# Patient Record
Sex: MALE | Race: WHITE | NOT HISPANIC OR LATINO | Employment: OTHER | URBAN - METROPOLITAN AREA
[De-identification: names, ages, dates, MRNs, and addresses within clinical notes are randomized per-mention and may not be internally consistent; named-entity substitution may affect disease eponyms.]

---

## 2023-02-08 PROBLEM — E66.09 CLASS 1 OBESITY DUE TO EXCESS CALORIES WITH BODY MASS INDEX (BMI) OF 33.0 TO 33.9 IN ADULT: Status: ACTIVE | Noted: 2023-02-08

## 2023-02-08 PROBLEM — R42 DIZZINESS: Status: ACTIVE | Noted: 2023-02-08

## 2023-02-08 PROBLEM — G89.29 CHRONIC LOW BACK PAIN WITH BILATERAL SCIATICA: Status: ACTIVE | Noted: 2023-02-08

## 2023-02-08 PROBLEM — R11.0 NAUSEA IN ADULT: Status: ACTIVE | Noted: 2023-02-08

## 2023-02-08 PROBLEM — E11.65 TYPE 2 DIABETES MELLITUS WITH HYPERGLYCEMIA (MULTI): Status: ACTIVE | Noted: 2023-02-08

## 2023-02-08 PROBLEM — I25.10 ATHEROSCLEROTIC HEART DISEASE OF NATIVE CORONARY ARTERY WITHOUT ANGINA PECTORIS: Status: ACTIVE | Noted: 2023-02-08

## 2023-02-08 PROBLEM — M54.41 CHRONIC LOW BACK PAIN WITH BILATERAL SCIATICA: Status: ACTIVE | Noted: 2023-02-08

## 2023-02-08 PROBLEM — J01.90 ACUTE SINUSITIS: Status: ACTIVE | Noted: 2023-02-08

## 2023-02-08 PROBLEM — R10.12 LEFT UPPER QUADRANT ABDOMINAL PAIN: Status: ACTIVE | Noted: 2023-02-08

## 2023-02-08 PROBLEM — K20.80 ESOPHAGITIS, LOS ANGELES GRADE A: Status: ACTIVE | Noted: 2023-02-08

## 2023-02-08 PROBLEM — Z86.010 HISTORY OF COLON POLYPS: Status: ACTIVE | Noted: 2023-02-08

## 2023-02-08 PROBLEM — E29.1 HYPOGONADISM MALE: Status: ACTIVE | Noted: 2023-02-08

## 2023-02-08 PROBLEM — Z86.0100 HISTORY OF COLON POLYPS: Status: ACTIVE | Noted: 2023-02-08

## 2023-02-08 PROBLEM — R10.13 EPIGASTRIC PAIN: Status: ACTIVE | Noted: 2023-02-08

## 2023-02-08 PROBLEM — E78.5 HYPERLIPIDEMIA: Status: ACTIVE | Noted: 2023-02-08

## 2023-02-08 PROBLEM — M79.89 RIGHT LEG SWELLING: Status: ACTIVE | Noted: 2023-02-08

## 2023-02-08 PROBLEM — R06.02 EXERCISE-INDUCED SHORTNESS OF BREATH: Status: ACTIVE | Noted: 2023-02-08
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
## 2023-02-08 PROBLEM — R13.10 DYSPHAGIA: Status: ACTIVE | Noted: 2023-02-08

## 2023-02-08 PROBLEM — H81.10 BPV (BENIGN POSITIONAL VERTIGO): Status: ACTIVE | Noted: 2023-02-08

## 2023-02-08 PROBLEM — I10 BENIGN ESSENTIAL HYPERTENSION: Status: ACTIVE | Noted: 2023-02-08

## 2023-02-08 PROBLEM — M96.1 FAILED BACK SYNDROME OF LUMBAR SPINE: Status: ACTIVE | Noted: 2023-02-08

## 2023-02-08 PROBLEM — M54.42 CHRONIC LOW BACK PAIN WITH BILATERAL SCIATICA: Status: ACTIVE | Noted: 2023-02-08

## 2023-02-08 PROBLEM — K21.9 GERD (GASTROESOPHAGEAL REFLUX DISEASE): Status: ACTIVE | Noted: 2023-02-08

## 2023-02-08 PROBLEM — R00.2 PALPITATIONS: Status: ACTIVE | Noted: 2023-02-08

## 2023-02-08 PROBLEM — E66.811 CLASS 1 OBESITY DUE TO EXCESS CALORIES WITH BODY MASS INDEX (BMI) OF 33.0 TO 33.9 IN ADULT: Status: ACTIVE | Noted: 2023-02-08

## 2023-02-08 PROBLEM — Z95.5 PRESENCE OF STENT IN CORONARY ARTERY: Status: ACTIVE | Noted: 2023-02-08

## 2023-02-08 RX ORDER — ATORVASTATIN CALCIUM 80 MG/1
1 TABLET, FILM COATED ORAL NIGHTLY
COMMUNITY
Start: 2017-10-25 | End: 2023-03-22 | Stop reason: SDUPTHER

## 2023-02-08 RX ORDER — CARVEDILOL 3.12 MG/1
1 TABLET ORAL
COMMUNITY
Start: 2017-05-19 | End: 2023-03-22 | Stop reason: SDUPTHER

## 2023-02-08 RX ORDER — INSULIN LISPRO 200 [IU]/ML
46 INJECTION, SOLUTION SUBCUTANEOUS
COMMUNITY
Start: 2017-10-19 | End: 2023-07-05

## 2023-02-08 RX ORDER — LISINOPRIL 5 MG/1
1 TABLET ORAL DAILY
COMMUNITY
Start: 2017-05-19 | End: 2023-03-22 | Stop reason: SDUPTHER

## 2023-02-08 RX ORDER — PANTOPRAZOLE SODIUM 40 MG/1
1 TABLET, DELAYED RELEASE ORAL 2 TIMES DAILY
COMMUNITY
Start: 2020-12-11 | End: 2023-03-22 | Stop reason: SDUPTHER

## 2023-02-08 RX ORDER — BLOOD SUGAR DIAGNOSTIC
STRIP MISCELLANEOUS 4 TIMES DAILY
COMMUNITY
Start: 2017-06-26 | End: 2023-06-27 | Stop reason: WASHOUT

## 2023-02-08 RX ORDER — TRIAZOLAM 0.25 MG/1
0.25 TABLET ORAL
COMMUNITY
End: 2023-06-27 | Stop reason: WASHOUT

## 2023-03-13 LAB
ALANINE AMINOTRANSFERASE (SGPT) (U/L) IN SER/PLAS: 14 U/L (ref 10–52)
ALBUMIN (G/DL) IN SER/PLAS: 4.1 G/DL (ref 3.4–5)
ALKALINE PHOSPHATASE (U/L) IN SER/PLAS: 80 U/L (ref 33–136)
ANION GAP IN SER/PLAS: 9 MMOL/L (ref 10–20)
ASPARTATE AMINOTRANSFERASE (SGOT) (U/L) IN SER/PLAS: 12 U/L (ref 9–39)
BILIRUBIN TOTAL (MG/DL) IN SER/PLAS: 0.7 MG/DL (ref 0–1.2)
CALCIUM (MG/DL) IN SER/PLAS: 9.1 MG/DL (ref 8.6–10.3)
CARBON DIOXIDE, TOTAL (MMOL/L) IN SER/PLAS: 30 MMOL/L (ref 21–32)
CHLORIDE (MMOL/L) IN SER/PLAS: 103 MMOL/L (ref 98–107)
CHOLESTEROL (MG/DL) IN SER/PLAS: 176 MG/DL (ref 0–199)
CHOLESTEROL IN HDL (MG/DL) IN SER/PLAS: 51.4 MG/DL
CHOLESTEROL/HDL RATIO: 3.4
CREATININE (MG/DL) IN SER/PLAS: 0.84 MG/DL (ref 0.5–1.3)
ERYTHROCYTE DISTRIBUTION WIDTH (RATIO) BY AUTOMATED COUNT: 12.1 % (ref 11.5–14.5)
ERYTHROCYTE MEAN CORPUSCULAR HEMOGLOBIN CONCENTRATION (G/DL) BY AUTOMATED: 33.5 G/DL (ref 32–36)
ERYTHROCYTE MEAN CORPUSCULAR VOLUME (FL) BY AUTOMATED COUNT: 90 FL (ref 80–100)
ERYTHROCYTES (10*6/UL) IN BLOOD BY AUTOMATED COUNT: 5.23 X10E12/L (ref 4.5–5.9)
ESTIMATED AVERAGE GLUCOSE FOR HBA1C: 197 MG/DL
GFR MALE: >90 ML/MIN/1.73M2
GLUCOSE (MG/DL) IN SER/PLAS: 177 MG/DL (ref 74–99)
HEMATOCRIT (%) IN BLOOD BY AUTOMATED COUNT: 46.9 % (ref 41–52)
HEMOGLOBIN (G/DL) IN BLOOD: 15.7 G/DL (ref 13.5–17.5)
HEMOGLOBIN A1C/HEMOGLOBIN TOTAL IN BLOOD: 8.5 %
LDL: 99 MG/DL (ref 0–99)
LEUKOCYTES (10*3/UL) IN BLOOD BY AUTOMATED COUNT: 6.6 X10E9/L (ref 4.4–11.3)
PLATELETS (10*3/UL) IN BLOOD AUTOMATED COUNT: 177 X10E9/L (ref 150–450)
POTASSIUM (MMOL/L) IN SER/PLAS: 3.9 MMOL/L (ref 3.5–5.3)
PROSTATE SPECIFIC ANTIGEN,SCREEN: 0.43 NG/ML (ref 0–4)
PROTEIN TOTAL: 7 G/DL (ref 6.4–8.2)
SODIUM (MMOL/L) IN SER/PLAS: 138 MMOL/L (ref 136–145)
TRIGLYCERIDE (MG/DL) IN SER/PLAS: 126 MG/DL (ref 0–149)
UREA NITROGEN (MG/DL) IN SER/PLAS: 18 MG/DL (ref 6–23)
VLDL: 25 MG/DL (ref 0–40)

## 2023-03-16 ENCOUNTER — PATIENT MESSAGE (OUTPATIENT)
Dept: PRIMARY CARE | Facility: CLINIC | Age: 63
End: 2023-03-16
Payer: COMMERCIAL

## 2023-03-16 NOTE — PATIENT COMMUNICATION
Fax from  MED received stating that patient requested a CGM and supplies from them. Form completed and faxed.   Virginia Factor, DO
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
## 2023-03-21 PROBLEM — M47.817 LUMBOSACRAL SPONDYLOSIS WITHOUT MYELOPATHY: Status: ACTIVE | Noted: 2017-11-06

## 2023-03-21 PROBLEM — M48.02 SPINAL STENOSIS OF CERVICAL REGION: Status: ACTIVE | Noted: 2020-06-10

## 2023-03-22 ENCOUNTER — OFFICE VISIT (OUTPATIENT)
Dept: PRIMARY CARE | Facility: CLINIC | Age: 63
End: 2023-03-22
Payer: COMMERCIAL

## 2023-03-22 VITALS
DIASTOLIC BLOOD PRESSURE: 75 MMHG | WEIGHT: 285 LBS | TEMPERATURE: 97.1 F | OXYGEN SATURATION: 98 % | HEIGHT: 71 IN | RESPIRATION RATE: 18 BRPM | HEART RATE: 78 BPM | BODY MASS INDEX: 39.9 KG/M2 | SYSTOLIC BLOOD PRESSURE: 132 MMHG

## 2023-03-22 DIAGNOSIS — R07.81 RIB PAIN ON RIGHT SIDE: ICD-10-CM

## 2023-03-22 DIAGNOSIS — E78.5 HYPERLIPIDEMIA, UNSPECIFIED HYPERLIPIDEMIA TYPE: ICD-10-CM

## 2023-03-22 DIAGNOSIS — K21.00 GASTROESOPHAGEAL REFLUX DISEASE WITH ESOPHAGITIS WITHOUT HEMORRHAGE: ICD-10-CM

## 2023-03-22 DIAGNOSIS — M79.89 LEG SWELLING: ICD-10-CM

## 2023-03-22 DIAGNOSIS — Z79.4 TYPE 2 DIABETES MELLITUS WITH HYPERGLYCEMIA, WITH LONG-TERM CURRENT USE OF INSULIN (MULTI): Primary | ICD-10-CM

## 2023-03-22 DIAGNOSIS — E11.65 TYPE 2 DIABETES MELLITUS WITH HYPERGLYCEMIA, WITH LONG-TERM CURRENT USE OF INSULIN (MULTI): Primary | ICD-10-CM

## 2023-03-22 DIAGNOSIS — I25.10 ATHEROSCLEROSIS OF NATIVE CORONARY ARTERY OF NATIVE HEART WITHOUT ANGINA PECTORIS: ICD-10-CM

## 2023-03-22 DIAGNOSIS — I10 BENIGN ESSENTIAL HYPERTENSION: ICD-10-CM

## 2023-03-22 PROBLEM — J01.90 ACUTE SINUSITIS: Status: RESOLVED | Noted: 2023-02-08 | Resolved: 2023-03-22

## 2023-03-22 PROBLEM — R39.15 URINARY URGENCY: Status: ACTIVE | Noted: 2023-03-22

## 2023-03-22 PROCEDURE — 99214 OFFICE O/P EST MOD 30 MIN: CPT | Performed by: FAMILY MEDICINE

## 2023-03-22 PROCEDURE — 3052F HG A1C>EQUAL 8.0%<EQUAL 9.0%: CPT | Performed by: FAMILY MEDICINE

## 2023-03-22 PROCEDURE — 1036F TOBACCO NON-USER: CPT | Performed by: FAMILY MEDICINE

## 2023-03-22 PROCEDURE — 3075F SYST BP GE 130 - 139MM HG: CPT | Performed by: FAMILY MEDICINE

## 2023-03-22 PROCEDURE — 4010F ACE/ARB THERAPY RXD/TAKEN: CPT | Performed by: FAMILY MEDICINE

## 2023-03-22 PROCEDURE — 3078F DIAST BP <80 MM HG: CPT | Performed by: FAMILY MEDICINE

## 2023-03-22 RX ORDER — ATORVASTATIN CALCIUM 80 MG/1
80 TABLET, FILM COATED ORAL NIGHTLY
Qty: 90 TABLET | Refills: 3 | Status: SHIPPED | OUTPATIENT
Start: 2023-03-22 | End: 2023-12-21 | Stop reason: SDUPTHER

## 2023-03-22 RX ORDER — INSULIN DETEMIR 100 [IU]/ML
50 INJECTION, SOLUTION SUBCUTANEOUS NIGHTLY
Qty: 45 ML | Refills: 3 | Status: SHIPPED | OUTPATIENT
Start: 2023-03-22 | End: 2024-03-21

## 2023-03-22 RX ORDER — BLOOD-GLUCOSE SENSOR
EACH MISCELLANEOUS
Qty: 6 EACH | Refills: 3 | Status: SHIPPED | OUTPATIENT
Start: 2023-03-22 | End: 2023-04-11 | Stop reason: SDUPTHER

## 2023-03-22 RX ORDER — LISINOPRIL 5 MG/1
5 TABLET ORAL DAILY
Qty: 90 TABLET | Refills: 3 | Status: SHIPPED | OUTPATIENT
Start: 2023-03-22 | End: 2024-03-21 | Stop reason: SDUPTHER

## 2023-03-22 RX ORDER — FUROSEMIDE 40 MG/1
40 TABLET ORAL DAILY PRN
Qty: 90 TABLET | Refills: 3 | Status: SHIPPED | OUTPATIENT
Start: 2023-03-22 | End: 2024-03-21

## 2023-03-22 RX ORDER — PANTOPRAZOLE SODIUM 40 MG/1
40 TABLET, DELAYED RELEASE ORAL 2 TIMES DAILY
Qty: 180 TABLET | Refills: 3 | Status: SHIPPED | OUTPATIENT
Start: 2023-03-22 | End: 2024-03-21

## 2023-03-22 RX ORDER — CARVEDILOL 3.12 MG/1
3.12 TABLET ORAL
Qty: 180 TABLET | Refills: 3 | Status: SHIPPED | OUTPATIENT
Start: 2023-03-22 | End: 2024-03-21

## 2023-03-22 SDOH — ECONOMIC STABILITY: FOOD INSECURITY: WITHIN THE PAST 12 MONTHS, YOU WORRIED THAT YOUR FOOD WOULD RUN OUT BEFORE YOU GOT MONEY TO BUY MORE.: NEVER TRUE

## 2023-03-22 SDOH — ECONOMIC STABILITY: FOOD INSECURITY: WITHIN THE PAST 12 MONTHS, THE FOOD YOU BOUGHT JUST DIDN'T LAST AND YOU DIDN'T HAVE MONEY TO GET MORE.: NEVER TRUE

## 2023-03-22 ASSESSMENT — ENCOUNTER SYMPTOMS
LOSS OF SENSATION IN FEET: 1
SWEATS: 1
OCCASIONAL FEELINGS OF UNSTEADINESS: 1
TREMORS: 1
DEPRESSION: 0

## 2023-03-22 ASSESSMENT — PATIENT HEALTH QUESTIONNAIRE - PHQ9
SUM OF ALL RESPONSES TO PHQ9 QUESTIONS 1 & 2: 0
2. FEELING DOWN, DEPRESSED OR HOPELESS: NOT AT ALL
1. LITTLE INTEREST OR PLEASURE IN DOING THINGS: NOT AT ALL

## 2023-03-22 ASSESSMENT — LIFESTYLE VARIABLES
AUDIT-C TOTAL SCORE: 0
HOW MANY STANDARD DRINKS CONTAINING ALCOHOL DO YOU HAVE ON A TYPICAL DAY: PATIENT DOES NOT DRINK
HOW OFTEN DO YOU HAVE SIX OR MORE DRINKS ON ONE OCCASION: NEVER
SKIP TO QUESTIONS 9-10: 1
HOW OFTEN DO YOU HAVE A DRINK CONTAINING ALCOHOL: NEVER

## 2023-03-22 NOTE — ASSESSMENT & PLAN NOTE
Uncontrolled with A1C of 8.5%  Continue Humalog TID with meals  Add Levemir at bedtime - patient given instructions to start with 20 units and work his way up based on fasting glucose (patient was on this medication years ago)

## 2023-03-22 NOTE — PROGRESS NOTES
Subjective   Patient ID: Rahul Hernandez is a 62 y.o. male who presents for Chest Pain (Bilateral X 2 weeks) and Diabetes.  Chest Pain   This is a new problem. Episode onset: 2 weeks ago, no injury. The onset quality is sudden. The problem occurs constantly. The problem has been gradually improving. Pain location: Right lower ribs and also hurts same level posteriorly. The pain is moderate. The pain does not radiate. The pain is aggravated by movement.   Diabetes  He presents for his follow-up diabetic visit. He has type 2 diabetes mellitus. His disease course has been stable. Hypoglycemia symptoms include sweats and tremors. Associated symptoms include polyuria. Hypoglycemia complications include nocturnal hypoglycemia. Current diabetic treatment includes insulin injections and oral agent (monotherapy). He participates in exercise weekly (limited ability to exercise due to back issues and associated legs weakness). An ACE inhibitor/angiotensin II receptor blocker is being taken.         Current Outpatient Medications   Medication Sig Dispense Refill    insulin lispro (HumaLOG KwikPen Insulin) 200 unit/mL (3 mL) insulin pen pen Inject 46 Units under the skin in the morning and 46 Units at noon and 46 Units in the evening. Inject with meals.      OneTouch Ultra Test strip in the morning, at noon, in the evening, and at bedtime.      triazolam (Halcion) 0.25 mg tablet Take 1 tablet (250 mcg) by mouth. 1 hour prior to appointment bring 2nd tablet to appointment      atorvastatin (Lipitor) 80 mg tablet Take 1 tablet (80 mg) by mouth once daily at bedtime. 90 tablet 3    blood-glucose sensor (FreeStyle Daniel 3 Sensor) device Apply sensor, use to monitor glucose, replace sensor every 14 days. 6 each 3    carvedilol (Coreg) 3.125 mg tablet Take 1 tablet (3.125 mg) by mouth in the morning and 1 tablet (3.125 mg) in the evening. Take with meals. 180 tablet 3    furosemide (Lasix) 40 mg tablet Take 1 tablet (40 mg) by mouth once  "daily as needed (leg swelling). 90 tablet 3    insulin detemir (Levemir FlexPen) 100 unit/mL (3 mL) pen Inject 50 Units under the skin once daily at bedtime. 45 mL 3    lisinopril 5 mg tablet Take 1 tablet (5 mg) by mouth once daily. 90 tablet 3    pantoprazole (ProtoNix) 40 mg EC tablet Take 1 tablet (40 mg) by mouth in the morning and 1 tablet (40 mg) before bedtime. 180 tablet 3     No current facility-administered medications for this visit.     Past Surgical History:   Procedure Laterality Date    ANKLE SURGERY  02/15/2018    Ankle Surgery    BACK SURGERY  02/15/2018    Back Surgery    CARPAL TUNNEL RELEASE  02/15/2018    Neuroplasty Decompression Median Nerve At Carpal Tunnel    CORONARY ANGIOPLASTY  02/15/2018    PTCA    EYE SURGERY  02/15/2018    Eye Surgery    JOINT REPLACEMENT  2016/2022    KNEE SURGERY  02/15/2018    Knee Surgery    OTHER SURGICAL HISTORY  06/08/2022    Knee replacement    OTHER SURGICAL HISTORY  08/16/2021    Knee surgery    OTHER SURGICAL HISTORY  08/16/2021    Knee arthroscopy    TOTAL KNEE ARTHROPLASTY  03/06/2018    Total Knee Replacement Left    VENTRICULOPERITONEAL SHUNT  2006     Family History   Problem Relation Name Age of Onset    Hypertension Mother      Diabetes Father Philipp Nava     Hypertension Father Philipp Nava     Diabetes Sister Mckayla Schneider     Hypertension Sister Mckayla Schneider     Coronary artery disease Brother Jason Hernandez     Diabetes Brother Jason Hernandez     Hypertension Brother Jason Lozanogg       Social History     Tobacco Use    Smoking status: Never     Passive exposure: Never    Smokeless tobacco: Never   Vaping Use    Vaping status: Never Used   Substance Use Topics    Alcohol use: Not Currently    Drug use: Yes     Types: Marijuana     Comment: Medical Marijuana        Objective     Visit Vitals  /75 (BP Location: Left arm, Patient Position: Sitting, BP Cuff Size: Large adult)   Pulse 78   Temp 36.2 °C (97.1 °F)   Resp 18   Ht 1.803 m (5' 11\") "   Wt 129 kg (285 lb)   SpO2 98%   BMI 39.75 kg/m²   Smoking Status Never   BSA 2.54 m²        Constitutional: Well nourished, well developed, appears stated age  Eyes: no scleral icterus, no conjunctival injection  Cardiovascular: regular rate and rhythm, +1 leg edema  Respiratory: normal respiratory effort, clear to auscultation bilaterally  Musculoskeletal: tenderness to palpation of right rib 9 or 10 at junction with sternum and posteriorly at rib angle  Skin: Warm, dry. No rashes  Neurologic: Alert, CNs II-XII grossly intact..  Psych: Appropriate mood and affect.        Assessment/Plan   Problem List Items Addressed This Visit          Circulatory    Atherosclerotic heart disease of native coronary artery without angina pectoris    Relevant Medications    atorvastatin (Lipitor) 80 mg tablet    carvedilol (Coreg) 3.125 mg tablet    Benign essential hypertension    Relevant Medications    carvedilol (Coreg) 3.125 mg tablet    lisinopril 5 mg tablet       Digestive    GERD (gastroesophageal reflux disease)    Relevant Medications    pantoprazole (ProtoNix) 40 mg EC tablet       Endocrine/Metabolic    Type 2 diabetes mellitus with hyperglycemia (CMS/[facility])     Uncontrolled with A1C of 8.5%  Continue Humalog TID with meals  Add Levemir at bedtime - patient given instructions to start with 20 units and work his way up based on fasting glucose (patient was on this medication years ago)         Relevant Medications    lisinopril 5 mg tablet    insulin detemir (Levemir FlexPen) 100 unit/mL (3 mL) pen    blood-glucose sensor (FreeStyle Daniel 3 Sensor) device    Other Relevant Orders    Hemoglobin A1c    Comprehensive metabolic panel       Other    Hyperlipidemia - Primary    Relevant Medications    atorvastatin (Lipitor) 80 mg tablet     Other Visit Diagnoses       Leg swelling        Relevant Medications    furosemide (Lasix) 40 mg tablet    Rib pain on right side        Recommended chiropractic care.          All  pertinent lab work and results were reviewed with patient.     Follow up 3 months    Tena Bradley, DO

## 2023-03-22 NOTE — PATIENT INSTRUCTIONS
Thank you for choosing [facility] for your healthcare.   As always if you have any questions or concerns please do not hesitate to call our office at [phone] or through [employer].    Have a great day!  Tena Bradley, DO

## 2023-04-11 ENCOUNTER — TELEPHONE (OUTPATIENT)
Dept: PRIMARY CARE | Facility: CLINIC | Age: 63
End: 2023-04-11
Payer: COMMERCIAL

## 2023-04-11 DIAGNOSIS — E11.65 TYPE 2 DIABETES MELLITUS WITH HYPERGLYCEMIA, WITH LONG-TERM CURRENT USE OF INSULIN (MULTI): ICD-10-CM

## 2023-04-11 DIAGNOSIS — Z79.4 TYPE 2 DIABETES MELLITUS WITH HYPERGLYCEMIA, WITH LONG-TERM CURRENT USE OF INSULIN (MULTI): ICD-10-CM

## 2023-04-11 RX ORDER — BLOOD-GLUCOSE SENSOR
EACH MISCELLANEOUS
Qty: 6 EACH | Refills: 3 | Status: SHIPPED | OUTPATIENT
Start: 2023-04-11 | End: 2023-06-27

## 2023-04-11 NOTE — TELEPHONE ENCOUNTER
PHARMACY CALLED IN STATING THE PATIENT IS AT THE PHARMACY ASKING FOR THE FREESTYLE RADHAMES SENSORS TO BE FILLED. I DO NOT SEE IT IN THE CHART AS A MEDICATION THAT WAS ORDERED    PHARMACY: [facility] [place]

## 2023-04-11 NOTE — TELEPHONE ENCOUNTER
Was originally sent to mail order pharmacy during visit per patient request. Resubmitted to Tiesha Camarena. Tena Bradley DO

## 2023-06-27 ENCOUNTER — LAB (OUTPATIENT)
Dept: LAB | Facility: LAB | Age: 63
End: 2023-06-27
Payer: COMMERCIAL

## 2023-06-27 ENCOUNTER — OFFICE VISIT (OUTPATIENT)
Dept: PRIMARY CARE | Facility: CLINIC | Age: 63
End: 2023-06-27
Payer: COMMERCIAL

## 2023-06-27 VITALS
HEART RATE: 72 BPM | RESPIRATION RATE: 16 BRPM | BODY MASS INDEX: 38.22 KG/M2 | HEIGHT: 71 IN | WEIGHT: 273 LBS | SYSTOLIC BLOOD PRESSURE: 139 MMHG | DIASTOLIC BLOOD PRESSURE: 67 MMHG | TEMPERATURE: 97.7 F | OXYGEN SATURATION: 99 %

## 2023-06-27 DIAGNOSIS — Z79.4 TYPE 2 DIABETES MELLITUS WITH HYPERGLYCEMIA, WITH LONG-TERM CURRENT USE OF INSULIN (MULTI): Primary | ICD-10-CM

## 2023-06-27 DIAGNOSIS — R06.09 DYSPNEA ON EXERTION: ICD-10-CM

## 2023-06-27 DIAGNOSIS — E66.01 CLASS 2 SEVERE OBESITY DUE TO EXCESS CALORIES WITH SERIOUS COMORBIDITY AND BODY MASS INDEX (BMI) OF 38.0 TO 38.9 IN ADULT (MULTI): ICD-10-CM

## 2023-06-27 DIAGNOSIS — M54.41 CHRONIC BILATERAL LOW BACK PAIN WITH BILATERAL SCIATICA: ICD-10-CM

## 2023-06-27 DIAGNOSIS — M54.42 CHRONIC BILATERAL LOW BACK PAIN WITH BILATERAL SCIATICA: ICD-10-CM

## 2023-06-27 DIAGNOSIS — E11.65 TYPE 2 DIABETES MELLITUS WITH HYPERGLYCEMIA, WITH LONG-TERM CURRENT USE OF INSULIN (MULTI): ICD-10-CM

## 2023-06-27 DIAGNOSIS — G89.29 CHRONIC BILATERAL LOW BACK PAIN WITH BILATERAL SCIATICA: ICD-10-CM

## 2023-06-27 DIAGNOSIS — Z79.4 TYPE 2 DIABETES MELLITUS WITH HYPERGLYCEMIA, WITH LONG-TERM CURRENT USE OF INSULIN (MULTI): ICD-10-CM

## 2023-06-27 DIAGNOSIS — E11.65 TYPE 2 DIABETES MELLITUS WITH HYPERGLYCEMIA, WITH LONG-TERM CURRENT USE OF INSULIN (MULTI): Primary | ICD-10-CM

## 2023-06-27 PROBLEM — E66.812 CLASS 2 SEVERE OBESITY DUE TO EXCESS CALORIES WITH SERIOUS COMORBIDITY AND BODY MASS INDEX (BMI) OF 38.0 TO 38.9 IN ADULT: Status: ACTIVE | Noted: 2023-02-08

## 2023-06-27 PROBLEM — R10.13 EPIGASTRIC PAIN: Status: RESOLVED | Noted: 2023-02-08 | Resolved: 2023-06-27

## 2023-06-27 PROBLEM — R10.12 LEFT UPPER QUADRANT ABDOMINAL PAIN: Status: RESOLVED | Noted: 2023-02-08 | Resolved: 2023-06-27

## 2023-06-27 LAB
ALANINE AMINOTRANSFERASE (SGPT) (U/L) IN SER/PLAS: 18 U/L (ref 10–52)
ALBUMIN (G/DL) IN SER/PLAS: 3.9 G/DL (ref 3.4–5)
ALKALINE PHOSPHATASE (U/L) IN SER/PLAS: 72 U/L (ref 33–136)
ANION GAP IN SER/PLAS: 12 MMOL/L (ref 10–20)
ASPARTATE AMINOTRANSFERASE (SGOT) (U/L) IN SER/PLAS: 17 U/L (ref 9–39)
BILIRUBIN TOTAL (MG/DL) IN SER/PLAS: 0.4 MG/DL (ref 0–1.2)
CALCIUM (MG/DL) IN SER/PLAS: 8.7 MG/DL (ref 8.6–10.3)
CARBON DIOXIDE, TOTAL (MMOL/L) IN SER/PLAS: 28 MMOL/L (ref 21–32)
CHLORIDE (MMOL/L) IN SER/PLAS: 104 MMOL/L (ref 98–107)
CREATININE (MG/DL) IN SER/PLAS: 0.73 MG/DL (ref 0.5–1.3)
GFR MALE: >90 ML/MIN/1.73M2
GLUCOSE (MG/DL) IN SER/PLAS: 79 MG/DL (ref 74–99)
POTASSIUM (MMOL/L) IN SER/PLAS: 3.5 MMOL/L (ref 3.5–5.3)
PROTEIN TOTAL: 6.3 G/DL (ref 6.4–8.2)
SODIUM (MMOL/L) IN SER/PLAS: 140 MMOL/L (ref 136–145)
UREA NITROGEN (MG/DL) IN SER/PLAS: 17 MG/DL (ref 6–23)

## 2023-06-27 PROCEDURE — 4010F ACE/ARB THERAPY RXD/TAKEN: CPT | Performed by: FAMILY MEDICINE

## 2023-06-27 PROCEDURE — 80053 COMPREHEN METABOLIC PANEL: CPT

## 2023-06-27 PROCEDURE — 83036 HEMOGLOBIN GLYCOSYLATED A1C: CPT

## 2023-06-27 PROCEDURE — 3052F HG A1C>EQUAL 8.0%<EQUAL 9.0%: CPT | Performed by: FAMILY MEDICINE

## 2023-06-27 PROCEDURE — 3078F DIAST BP <80 MM HG: CPT | Performed by: FAMILY MEDICINE

## 2023-06-27 PROCEDURE — 99214 OFFICE O/P EST MOD 30 MIN: CPT | Performed by: FAMILY MEDICINE

## 2023-06-27 PROCEDURE — 1036F TOBACCO NON-USER: CPT | Performed by: FAMILY MEDICINE

## 2023-06-27 PROCEDURE — 36415 COLL VENOUS BLD VENIPUNCTURE: CPT

## 2023-06-27 PROCEDURE — 3008F BODY MASS INDEX DOCD: CPT | Performed by: FAMILY MEDICINE

## 2023-06-27 PROCEDURE — 3075F SYST BP GE 130 - 139MM HG: CPT | Performed by: FAMILY MEDICINE

## 2023-06-27 ASSESSMENT — ENCOUNTER SYMPTOMS
LOSS OF SENSATION IN FEET: 0
OCCASIONAL FEELINGS OF UNSTEADINESS: 1
DEPRESSION: 0

## 2023-06-27 NOTE — PATIENT INSTRUCTIONS
Please call Niharika (Patient Aníbal) to schedule your lung function test. Her number is [phone].       Thank you for choosing [facility] for your healthcare.   As always if you have any questions or concerns please do not hesitate to call our office at [phone] or through [employer].    Have a great day!  Tena Bradley, DO

## 2023-06-27 NOTE — ASSESSMENT & PLAN NOTE
Improved per home readings, patient will have A1C done today  Continue Humalog TID with meals and Levemir 50 units qHS

## 2023-06-27 NOTE — PROGRESS NOTES
"Subjective   Patient ID: Rahul Hernandez is a 62 y.o. male who presents for Diabetes (3 month checkup).  He is concerned about dyspnea when walking to his kitchen or going up the stairs.     At his last visit, I started him on basal insulin with instructions on titration. He has titrated himself up to 50 units at bedtime. His fasting glucose has been in the 120s. He checks his glucose at night prior to taking Levemir due to he has had episodes of hypoglycemia at night.         Current Outpatient Medications   Medication Sig Dispense Refill    atorvastatin (Lipitor) 80 mg tablet Take 1 tablet (80 mg) by mouth once daily at bedtime. 90 tablet 3    carvedilol (Coreg) 3.125 mg tablet Take 1 tablet (3.125 mg) by mouth in the morning and 1 tablet (3.125 mg) in the evening. Take with meals. 180 tablet 3    furosemide (Lasix) 40 mg tablet Take 1 tablet (40 mg) by mouth once daily as needed (leg swelling). 90 tablet 3    insulin detemir (Levemir FlexPen) 100 unit/mL (3 mL) pen Inject 50 Units under the skin once daily at bedtime. 45 mL 3    insulin lispro (HumaLOG KwikPen Insulin) 200 unit/mL (3 mL) insulin pen pen Inject 46 Units under the skin 3 times a day with meals.      lisinopril 5 mg tablet Take 1 tablet (5 mg) by mouth once daily. 90 tablet 3    pantoprazole (ProtoNix) 40 mg EC tablet Take 1 tablet (40 mg) by mouth in the morning and 1 tablet (40 mg) before bedtime. 180 tablet 3     No current facility-administered medications for this visit.       Objective     Visit Vitals  /67 (BP Location: Left arm, Patient Position: Sitting, BP Cuff Size: Large adult)   Pulse 72   Temp 36.5 °C (97.7 °F)   Resp 16   Ht 1.797 m (5' 10.75\")   Wt 124 kg (273 lb)   SpO2 99%   BMI 38.35 kg/m²   Smoking Status Never   BSA 2.49 m²      Constitutional: Well nourished, well developed, appears stated age  Eyes: no scleral icterus, no conjunctival injection  Respiratory: normal respiratory effort  Skin: Warm, dry.  Neurologic: Alert, " CNs II-XII grossly intact..  Psych: Appropriate mood and affect.        Assessment/Plan   Problem List Items Addressed This Visit       Chronic low back pain with bilateral sciatica     Scheduled for a shot from pain management next month         Class 2 severe obesity due to excess calories with serious comorbidity and body mass index (BMI) of 38.0 to 38.9 in adult (CMS/[facility])     Down 12 lbs in past 3 months.         Type 2 diabetes mellitus with hyperglycemia (CMS/[facility]) - Primary     Improved per home readings, patient will have A1C done today  Continue Humalog TID with meals and Levemir 50 units qHS          Other Visit Diagnoses       Dyspnea on exertion        Relevant Orders    Pulmonary function testing          Follow up 3 months.    Tena Bradley, DO

## 2023-06-28 LAB
ESTIMATED AVERAGE GLUCOSE FOR HBA1C: 151 MG/DL
HEMOGLOBIN A1C/HEMOGLOBIN TOTAL IN BLOOD: 6.9 %

## 2023-07-04 DIAGNOSIS — Z79.4 TYPE 2 DIABETES MELLITUS WITH HYPERGLYCEMIA, WITH LONG-TERM CURRENT USE OF INSULIN (MULTI): Primary | ICD-10-CM

## 2023-07-04 DIAGNOSIS — E11.65 TYPE 2 DIABETES MELLITUS WITH HYPERGLYCEMIA, WITH LONG-TERM CURRENT USE OF INSULIN (MULTI): Primary | ICD-10-CM

## 2023-07-05 PROBLEM — M54.14 RADICULITIS, THORACIC: Status: ACTIVE | Noted: 2023-04-05

## 2023-07-05 RX ORDER — SITAGLIPTIN 100 MG/1
100 TABLET, FILM COATED ORAL DAILY
COMMUNITY
Start: 2023-05-19 | End: 2023-08-17

## 2023-07-05 RX ORDER — DICLOFENAC EPOLAMINE 0.01 G/1
1 SYSTEM TOPICAL 2 TIMES DAILY
Qty: 60 PATCH | Refills: 2 | COMMUNITY
Start: 2023-06-22 | End: 2023-09-20

## 2023-07-05 RX ORDER — INSULIN LISPRO 200 [IU]/ML
INJECTION, SOLUTION SUBCUTANEOUS
Qty: 810 ML | Refills: 3 | Status: SHIPPED | OUTPATIENT
Start: 2023-07-05 | End: 2024-01-09 | Stop reason: SDUPTHER

## 2023-08-17 DIAGNOSIS — Z79.4 TYPE 2 DIABETES MELLITUS WITH HYPERGLYCEMIA, WITH LONG-TERM CURRENT USE OF INSULIN (MULTI): Primary | ICD-10-CM

## 2023-08-17 DIAGNOSIS — E11.65 TYPE 2 DIABETES MELLITUS WITH HYPERGLYCEMIA, WITH LONG-TERM CURRENT USE OF INSULIN (MULTI): Primary | ICD-10-CM

## 2023-08-17 RX ORDER — SITAGLIPTIN 100 MG/1
100 TABLET, FILM COATED ORAL DAILY
Qty: 90 TABLET | Refills: 3 | Status: SHIPPED | OUTPATIENT
Start: 2023-08-17 | End: 2024-01-03 | Stop reason: WASHOUT

## 2023-11-02 ENCOUNTER — TELEPHONE (OUTPATIENT)
Dept: PRIMARY CARE | Facility: CLINIC | Age: 63
End: 2023-11-02
Payer: COMMERCIAL

## 2023-11-02 DIAGNOSIS — J06.9 ACUTE URI: Primary | ICD-10-CM

## 2023-11-02 RX ORDER — AZITHROMYCIN 250 MG/1
TABLET, FILM COATED ORAL
Qty: 6 TABLET | Refills: 0 | Status: SHIPPED | OUTPATIENT
Start: 2023-11-02 | End: 2023-11-07

## 2023-11-02 NOTE — TELEPHONE ENCOUNTER
How long have you been sick? 2 weeks  Cough (productive or nonproductive)? Yes   Color of phlegm? N/A  Runny nose ? Yes  Sinus pain? Yes  Sinus drainage/color? No  Earache? Yes  Congestion? Yes  Headache? Yes  Dizziness ? Yes  Fever (if yes, temp)? No   Sore throat? Yes  Short of breath/wheezing?Yes   OTC medication? Tylenol     Negative covid test two weeks ago    LOV: 6.27.23  NOV:1.9.24

## 2023-11-15 ENCOUNTER — TELEPHONE (OUTPATIENT)
Dept: PRIMARY CARE | Facility: CLINIC | Age: 63
End: 2023-11-15
Payer: COMMERCIAL

## 2023-11-15 DIAGNOSIS — J06.9 ACUTE URI: Primary | ICD-10-CM

## 2023-11-15 NOTE — TELEPHONE ENCOUNTER
Talked with pt and he said he was calling because he believes he has a sinus infection and the antibiotic you called in for him didn't work.  He is still having ear pain (4 to 5 on pain scale) and his ears are itching.  Feels like his head is all congested. Has had a fever, highest temp being 100.0'.  There is no color to his nose  drainage.  He is also having pain above his eyes.  He said last time he had this you have him an antibiotic and he was feeling 100% better.

## 2023-11-15 NOTE — TELEPHONE ENCOUNTER
Patient called in stating that Dr. Bradley called in some med's a couple of weeks ago for him and they aren't helping. Patient states that he is hurting all over (back, legs, neck, fingers and ears). Please advise.

## 2023-11-15 NOTE — TELEPHONE ENCOUNTER
I looked at his chart and it was his pain doctor that sent in lidocaine patches and ketorolac anti-inflammatory medication for him to take for 5 days on 11/2/23. I recommend he reach out to them.     The only thing I sent in recently was an antibiotic when he was sick a few weeks ago.   Thanks,  Tena Bradley, DO

## 2023-11-16 RX ORDER — AMOXICILLIN AND CLAVULANATE POTASSIUM 875; 125 MG/1; MG/1
875 TABLET, FILM COATED ORAL 2 TIMES DAILY
Qty: 14 TABLET | Refills: 0 | Status: SHIPPED | OUTPATIENT
Start: 2023-11-16 | End: 2023-11-23

## 2023-12-21 ENCOUNTER — TELEPHONE (OUTPATIENT)
Dept: PRIMARY CARE | Facility: CLINIC | Age: 63
End: 2023-12-21
Payer: COMMERCIAL

## 2023-12-21 DIAGNOSIS — E78.5 HYPERLIPIDEMIA, UNSPECIFIED HYPERLIPIDEMIA TYPE: ICD-10-CM

## 2023-12-21 DIAGNOSIS — I25.10 ATHEROSCLEROSIS OF NATIVE CORONARY ARTERY OF NATIVE HEART WITHOUT ANGINA PECTORIS: ICD-10-CM

## 2023-12-21 RX ORDER — ATORVASTATIN CALCIUM 80 MG/1
80 TABLET, FILM COATED ORAL NIGHTLY
Qty: 90 TABLET | Refills: 3 | Status: SHIPPED | OUTPATIENT
Start: 2023-12-21 | End: 2023-12-26 | Stop reason: SDUPTHER

## 2023-12-21 NOTE — TELEPHONE ENCOUNTER
PATIENT CALLED IN STATING THAT HE NEEDS A REFILL ON HIS ATORVASTATIN 80 MG TABLET. PATIENT STATES HE HAS BEEN OUT OF THE MED FOR TWO WEEKS AND THAT HE TOLD THE PHARMACY TO ESCRIPT IT TO US AND THEY KEEP TELLING HIM WE HAVENT RESPONDED. PLEASE ADVISE.

## 2023-12-26 ENCOUNTER — TELEPHONE (OUTPATIENT)
Dept: PRIMARY CARE | Facility: CLINIC | Age: 63
End: 2023-12-26
Payer: COMMERCIAL

## 2023-12-26 DIAGNOSIS — E78.5 HYPERLIPIDEMIA, UNSPECIFIED HYPERLIPIDEMIA TYPE: ICD-10-CM

## 2023-12-26 DIAGNOSIS — I25.10 ATHEROSCLEROSIS OF NATIVE CORONARY ARTERY OF NATIVE HEART WITHOUT ANGINA PECTORIS: ICD-10-CM

## 2023-12-26 RX ORDER — ATORVASTATIN CALCIUM 80 MG/1
80 TABLET, FILM COATED ORAL NIGHTLY
Qty: 90 TABLET | Refills: 0 | Status: SHIPPED | OUTPATIENT
Start: 2023-12-26 | End: 2024-03-18

## 2023-12-26 NOTE — TELEPHONE ENCOUNTER
PATIENT CALLED IN STATING THAT HE NEEDED HIS ATORVASTATIN TO GO TO [facility] NOT [facility]. PATIENT STATES THAT HE USED [facility] BEFORE. PLEASE ADVISE. PATENT STATES THAT THE [facility] IS THROUGH [facility].

## 2024-01-02 RX ORDER — IBUPROFEN 800 MG/1
800 TABLET ORAL EVERY 8 HOURS PRN
COMMUNITY
Start: 2023-11-15 | End: 2024-05-13

## 2024-01-02 RX ORDER — ASPIRIN 81 MG/1
81 TABLET ORAL DAILY
COMMUNITY

## 2024-01-02 RX ORDER — LIDOCAINE 50 MG/G
1 PATCH TOPICAL
COMMUNITY
Start: 2023-11-06 | End: 2024-05-04

## 2024-01-02 RX ORDER — KETOROLAC TROMETHAMINE 10 MG/1
TABLET, FILM COATED ORAL
COMMUNITY
Start: 2023-11-02 | End: 2024-01-03 | Stop reason: WASHOUT

## 2024-01-02 NOTE — PROGRESS NOTES
"Primary Cardiologist: Dr. Leahy    Chief Complaint:   No chief complaint on file.     History Of Present Illness:    Rahul Hernandez is a 63 y.o. male with past medical history of  CAD s/p PCI of RCA 2006, hyperlipidemia, BPV, obesity, GERD, palpitations and DM type 2 who presents today for worsening shortness of breath and dizziness.    Today, Rahul Hernandez is feeling worsening shortness of breath, mostly while lying in bed that wakes him up. He also endorses dizziness/lightheadedness with rapid change of position. He reports weight gain over the last 6 months. He denies chest pain or pressure and endorses chronic palpitations that have not worsened over last few years.     Last Recorded Vitals:  Visit Vitals  /70 (BP Location: Left arm, Patient Position: Sitting, BP Cuff Size: Adult)   Pulse 84   Ht 1.778 m (5' 10\")   Wt 137 kg (301 lb 6.4 oz)   SpO2 97%   BMI 43.25 kg/m²   Smoking Status Never   BSA 2.6 m²        Past Medical History:  He has a past medical history of Acute upper respiratory infection, unspecified (05/13/2020), Arthritis, Diabetes mellitus (CMS/HCC), Myocardial infarction (CMS/HCC) (?), Nausea with vomiting, unspecified (12/27/2019), Neuromuscular disorder (CMS/HCC) (?), Other chest pain (12/11/2019), Other forms of dyspnea (12/20/2019), Other specified symptoms and signs involving the circulatory and respiratory systems (10/29/2020), Pain in throat (12/27/2019), Pain in unspecified joint (12/27/2019), Personal history of colonic polyps (03/06/2018), Personal history of other diseases of the digestive system (03/06/2018), Personal history of other diseases of the musculoskeletal system and connective tissue, Personal history of other diseases of the musculoskeletal system and connective tissue, Personal history of other diseases of the respiratory system (10/01/2020), Personal history of other specified conditions (12/27/2019), and Right upper quadrant pain (12/11/2019).    Past Surgical " History:  He has a past surgical history that includes Coronary angioplasty (02/15/2018); Carpal tunnel release (02/15/2018); Eye surgery (02/15/2018); Ankle surgery (02/15/2018); Knee surgery (02/15/2018); Back surgery (02/15/2018); Other surgical history (06/08/2022); Other surgical history (08/16/2021); Other surgical history (08/16/2021); Total knee arthroplasty (03/06/2018); Joint replacement (2016/2022); and Ventriculoperitoneal shunt (2006).      Social History:  He reports that he has never smoked. He has never been exposed to tobacco smoke. He has never used smokeless tobacco. He reports that he does not currently use alcohol. He reports that he does not currently use drugs after having used the following drugs: Marijuana.    Family History:  Family History   Problem Relation Name Age of Onset    Hypertension Mother      Diabetes Father Philipp Nava     Hypertension Father Philipp Nava     Diabetes Sister Mckayla Schneider     Hypertension Sister Mckayla Schneider     Coronary artery disease Brother Jason Hernandez     Diabetes Brother Jason Hernandez     Hypertension Brother Jason Hernandez         Allergies:  Patient has no known allergies.    Outpatient Medications:  Current Outpatient Medications   Medication Instructions    aspirin 81 mg, oral, Daily    atorvastatin (LIPITOR) 80 mg, oral, Nightly    carvedilol (COREG) 3.125 mg, oral, 2 times daily with meals    furosemide (LASIX) 40 mg, oral, Daily PRN    HumaLOG KwikPen Insulin 200 unit/mL (3 mL) insulin pen pen INJECT 46 UNITS UNDER THE SKIN THREE TIMES A DAY WITH MEALS    ibuprofen 800 mg, oral, Every 8 hours PRN    Levemir FlexPen 50 Units, subcutaneous, Nightly    lidocaine (Lidoderm) 5 % patch 1 patch, transdermal, Daily RT    lisinopril 5 mg, oral, Daily    OneTouch Ultra Test strip     pantoprazole (PROTONIX) 40 mg, oral, 2 times daily         Review of Systems   Constitutional: Positive for weight gain.   HENT: Negative.     Cardiovascular:  Positive for  dyspnea on exertion, leg swelling and palpitations (chronic for years). Negative for chest pain.   Respiratory:  Positive for shortness of breath.    Endocrine: Negative.    Hematologic/Lymphatic: Negative.    Skin: Negative.    Musculoskeletal:  Positive for arthritis.   Gastrointestinal: Negative.    Genitourinary: Negative.    Neurological:  Positive for dizziness (with rapid change of position) and light-headedness.   Psychiatric/Behavioral: Negative.     Allergic/Immunologic: Negative.         Physical Exam  Vitals reviewed.   Constitutional:       Appearance: Normal appearance. He is obese.   HENT:      Head: Normocephalic.      Mouth/Throat:      Mouth: Mucous membranes are moist.   Cardiovascular:      Rate and Rhythm: Normal rate and regular rhythm.      Pulses: Normal pulses.      Heart sounds: Normal heart sounds.   Pulmonary:      Effort: Pulmonary effort is normal.      Breath sounds: Normal breath sounds. No wheezing, rhonchi or rales.   Abdominal:      General: Bowel sounds are normal. There is no distension.      Palpations: Abdomen is soft.      Tenderness: There is no abdominal tenderness.   Musculoskeletal:      Cervical back: Normal range of motion.      Right lower leg: No edema.      Left lower leg: No edema.   Skin:     General: Skin is warm and dry.   Neurological:      General: No focal deficit present.      Mental Status: He is alert and oriented to person, place, and time.   Psychiatric:         Mood and Affect: Mood normal.         Behavior: Behavior normal.           Last Labs:  CBC -  Lab Results   Component Value Date    WBC 6.6 03/13/2023    HGB 15.7 03/13/2023    HCT 46.9 03/13/2023    MCV 90 03/13/2023     03/13/2023       CMP -  Lab Results   Component Value Date    CALCIUM 8.7 06/27/2023    PROT 6.3 (L) 06/27/2023    ALBUMIN 3.9 06/27/2023    AST 17 06/27/2023    ALT 18 06/27/2023    ALKPHOS 72 06/27/2023    BILITOT 0.4 06/27/2023       LIPID PANEL -   Lab Results  "  Component Value Date    CHOL 176 03/13/2023    TRIG 126 03/13/2023    HDL 51.4 03/13/2023    CHHDL 3.4 03/13/2023    LDLF 99 03/13/2023    VLDL 25 03/13/2023    NHDL 90 12/27/2019       RENAL FUNCTION PANEL -   Lab Results   Component Value Date    GLUCOSE 79 06/27/2023     06/27/2023    K 3.5 06/27/2023     06/27/2023    CO2 28 06/27/2023    ANIONGAP 12 06/27/2023    BUN 17 06/27/2023    CREATININE 0.73 06/27/2023    GFRMALE >90 06/27/2023    CALCIUM 8.7 06/27/2023    ALBUMIN 3.9 06/27/2023        Lab Results   Component Value Date    HGBA1C 6.9 (A) 06/27/2023       Last Cardiology Tests:  ECG:  Sinus rhythm with 1st degree AV block, PAC, rate of 76 bpm. This will go to Dr. Leahy for further review.     Echo:  TTE 2/22:  The left ventricular systolic function is normal with a 60% estimated ejection fraction.   2. Aortic valve stenosis is not present.  Ejection Fractions:  No results found for: \"EF\"    Cath:  No results found for this or any previous visit from the past 1095 days.      Stress Test:  NM Pharm stress 2/22:  Normal stress myocardial perfusion imaging in response to  pharmacologic stress.  2. Calculated ejection fraction of 54% without segmental wall motion  abnormalities seen.  Cardiac Imaging:  No results found for this or any previous visit from the past 1095 days.        Lab review: I have personally reviewed the laboratory result(s)     Assessment/Plan     Rahul Hernandez is a 63 y.o. male with past medical history of  CAD s/p PCI of RCA 2006, hyperlipidemia, BPV, obesity, GERD, palpitations and DM type 2 who presents today for worsening shortness of breath and dizziness.     Shortness of breath/dizziness  Describes shortness of breath that mostly appears while lying flat and waking him up from sleep.   Does not appear hypervolemic on exam today but states he has gained 10 lbs over last 6 months.   Check echocardiogram  Referral for sleep medicine, patient has history of sleep apnea, not " "always compliant with CPAP and hasn't been evaluated for \"many years\"  Depending on echo, may need pulmonary medicine or ischemic evaluation     CAD  S/p PCI of RCA 2006  [facility] Pharm stress 2/22:Normal stress myocardial perfusion imaging in response to pharmacologic stress. Calculated ejection fraction of 54% without segmental wall motion  abnormalities seen.  Today, denies chest pain or pressure.   LDL not at goal, will add Zetia today to high-intensity statin  Blood pressure is elevated today but reports home readings with systolics mostly in the 120s, he will monitor at home.   Continue on ASA, statin ,carvedilol and ACEi. Zetia added today.    Dyslipidemia  Lipid panel: 3/13/2023: Cholesterol 176 HDL 51 LDL 99 and triglycerides 126  LDL goal <70  Continue on atorvastatin 80 mg and will add Zetia to further reduce LDL.    History of sleep apnea  Has not followed up in many years  Referral for sleep medicine.     Emily Charles, APRN-CNP   "

## 2024-01-03 ENCOUNTER — OFFICE VISIT (OUTPATIENT)
Dept: CARDIOLOGY | Facility: CLINIC | Age: 64
End: 2024-01-03
Payer: MEDICARE

## 2024-01-03 VITALS
BODY MASS INDEX: 43.15 KG/M2 | WEIGHT: 301.4 LBS | HEART RATE: 84 BPM | DIASTOLIC BLOOD PRESSURE: 70 MMHG | HEIGHT: 70 IN | SYSTOLIC BLOOD PRESSURE: 142 MMHG | OXYGEN SATURATION: 97 %

## 2024-01-03 DIAGNOSIS — Z95.5 PRESENCE OF STENT IN CORONARY ARTERY: ICD-10-CM

## 2024-01-03 DIAGNOSIS — G47.30 SLEEP APNEA, UNSPECIFIED TYPE: ICD-10-CM

## 2024-01-03 DIAGNOSIS — R00.2 PALPITATIONS: ICD-10-CM

## 2024-01-03 DIAGNOSIS — R06.02 SHORTNESS OF BREATH: ICD-10-CM

## 2024-01-03 DIAGNOSIS — R42 DIZZINESS: ICD-10-CM

## 2024-01-03 DIAGNOSIS — I25.10 ATHEROSCLEROSIS OF NATIVE CORONARY ARTERY OF NATIVE HEART WITHOUT ANGINA PECTORIS: Primary | ICD-10-CM

## 2024-01-03 DIAGNOSIS — E78.2 MIXED HYPERLIPIDEMIA: ICD-10-CM

## 2024-01-03 DIAGNOSIS — I10 BENIGN ESSENTIAL HYPERTENSION: ICD-10-CM

## 2024-01-03 PROCEDURE — 99214 OFFICE O/P EST MOD 30 MIN: CPT | Performed by: CLINICAL NURSE SPECIALIST

## 2024-01-03 PROCEDURE — 3008F BODY MASS INDEX DOCD: CPT | Performed by: CLINICAL NURSE SPECIALIST

## 2024-01-03 PROCEDURE — 3078F DIAST BP <80 MM HG: CPT | Performed by: CLINICAL NURSE SPECIALIST

## 2024-01-03 PROCEDURE — 3077F SYST BP >= 140 MM HG: CPT | Performed by: CLINICAL NURSE SPECIALIST

## 2024-01-03 PROCEDURE — 4010F ACE/ARB THERAPY RXD/TAKEN: CPT | Performed by: CLINICAL NURSE SPECIALIST

## 2024-01-03 PROCEDURE — 93000 ELECTROCARDIOGRAM COMPLETE: CPT | Performed by: INTERNAL MEDICINE

## 2024-01-03 PROCEDURE — 1036F TOBACCO NON-USER: CPT | Performed by: CLINICAL NURSE SPECIALIST

## 2024-01-03 RX ORDER — EZETIMIBE 10 MG/1
10 TABLET ORAL DAILY
Qty: 90 TABLET | Refills: 1 | Status: SHIPPED | OUTPATIENT
Start: 2024-01-03 | End: 2024-07-01

## 2024-01-03 ASSESSMENT — ENCOUNTER SYMPTOMS
LIGHT-HEADEDNESS: 1
ALLERGIC/IMMUNOLOGIC NEGATIVE: 1
PSYCHIATRIC NEGATIVE: 1
ENDOCRINE NEGATIVE: 1
HEMATOLOGIC/LYMPHATIC NEGATIVE: 1
SHORTNESS OF BREATH: 1
WEIGHT GAIN: 1
DYSPNEA ON EXERTION: 1
DIZZINESS: 1
PALPITATIONS: 1
GASTROINTESTINAL NEGATIVE: 1

## 2024-01-03 NOTE — PATIENT INSTRUCTIONS
An echo has been ordered for you to complete to further evaluate your shortness of breath.  A referral for sleep medicine has been made for you to re-establish care for your sleep apnea.  Labs have been ordered for you to complete in one week with your other labs ordered by PCP.  A new medication has been ordered for you to further reduce your cholesterol. Start taking Zetia 10 mg daily to further reduce your LDL cholesterol.  Labs have been ordered for you to complete in 3 months to evaluate your cholesterol, these must be done fasting.   Please continue to monitor your heart rate and blood pressure at home, a goal blood pressure for you would be for top number to be less than 130 mmHG. If you can not reach this goal consistently, please call the office.   Call our office with any new cardiac concerns  Continue current medications  Continue heart-healthy diet. A diet low in sodium, low in cholesterol, limiting red meats and eating whole foods.   Follow up with Dr. Leahy in 3 months.

## 2024-01-04 ASSESSMENT — ENCOUNTER SYMPTOMS
SORE THROAT: 0
VOMITING: 0
RHINORRHEA: 1
COUGH: 0
HEADACHES: 1
ABDOMINAL PAIN: 0
NECK PAIN: 1
DIARRHEA: 0

## 2024-01-09 ENCOUNTER — OFFICE VISIT (OUTPATIENT)
Dept: PRIMARY CARE | Facility: CLINIC | Age: 64
End: 2024-01-09
Payer: MEDICARE

## 2024-01-09 VITALS
TEMPERATURE: 96.8 F | DIASTOLIC BLOOD PRESSURE: 70 MMHG | OXYGEN SATURATION: 99 % | WEIGHT: 302 LBS | SYSTOLIC BLOOD PRESSURE: 150 MMHG | RESPIRATION RATE: 16 BRPM | BODY MASS INDEX: 43.33 KG/M2 | HEART RATE: 78 BPM

## 2024-01-09 DIAGNOSIS — Z79.4 TYPE 2 DIABETES MELLITUS WITH HYPERGLYCEMIA, WITH LONG-TERM CURRENT USE OF INSULIN (MULTI): Primary | ICD-10-CM

## 2024-01-09 DIAGNOSIS — E11.65 TYPE 2 DIABETES MELLITUS WITH HYPERGLYCEMIA, WITH LONG-TERM CURRENT USE OF INSULIN (MULTI): Primary | ICD-10-CM

## 2024-01-09 DIAGNOSIS — E66.01 CLASS 3 SEVERE OBESITY DUE TO EXCESS CALORIES WITH SERIOUS COMORBIDITY AND BODY MASS INDEX (BMI) OF 40.0 TO 44.9 IN ADULT (MULTI): ICD-10-CM

## 2024-01-09 PROBLEM — E66.813 CLASS 3 SEVERE OBESITY DUE TO EXCESS CALORIES WITH SERIOUS COMORBIDITY AND BODY MASS INDEX (BMI) OF 40.0 TO 44.9 IN ADULT: Status: ACTIVE | Noted: 2023-02-08

## 2024-01-09 PROBLEM — E66.813 CLASS 3 SEVERE OBESITY DUE TO EXCESS CALORIES WITH SERIOUS COMORBIDITY AND BODY MASS INDEX (BMI) OF 40.0 TO 44.9 IN ADULT: Chronic | Status: ACTIVE | Noted: 2023-02-08

## 2024-01-09 PROCEDURE — 3078F DIAST BP <80 MM HG: CPT | Performed by: FAMILY MEDICINE

## 2024-01-09 PROCEDURE — 4010F ACE/ARB THERAPY RXD/TAKEN: CPT | Performed by: FAMILY MEDICINE

## 2024-01-09 PROCEDURE — 99214 OFFICE O/P EST MOD 30 MIN: CPT | Performed by: FAMILY MEDICINE

## 2024-01-09 PROCEDURE — 3077F SYST BP >= 140 MM HG: CPT | Performed by: FAMILY MEDICINE

## 2024-01-09 PROCEDURE — 3008F BODY MASS INDEX DOCD: CPT | Performed by: FAMILY MEDICINE

## 2024-01-09 RX ORDER — INSULIN LISPRO 200 [IU]/ML
INJECTION, SOLUTION SUBCUTANEOUS
Qty: 810 ML | Refills: 3 | Status: SHIPPED | OUTPATIENT
Start: 2024-01-09

## 2024-01-09 RX ORDER — ALBUTEROL SULFATE 90 UG/1
1 AEROSOL, METERED RESPIRATORY (INHALATION) EVERY 4 HOURS PRN
COMMUNITY
End: 2024-03-29 | Stop reason: WASHOUT

## 2024-01-09 RX ORDER — TIRZEPATIDE 5 MG/.5ML
5 INJECTION, SOLUTION SUBCUTANEOUS
Qty: 6 ML | Refills: 3 | Status: SHIPPED | OUTPATIENT
Start: 2024-01-09 | End: 2024-02-01

## 2024-01-09 RX ORDER — TIRZEPATIDE 2.5 MG/.5ML
2.5 INJECTION, SOLUTION SUBCUTANEOUS
Qty: 2 ML | Refills: 0 | Status: SHIPPED | OUTPATIENT
Start: 2024-01-09 | End: 2024-02-01

## 2024-01-09 RX ORDER — CELECOXIB 100 MG/1
100 CAPSULE ORAL EVERY 12 HOURS
COMMUNITY
Start: 2021-04-13

## 2024-01-09 RX ORDER — AMITRIPTYLINE HYDROCHLORIDE 25 MG/1
25 TABLET, FILM COATED ORAL NIGHTLY
COMMUNITY
Start: 2017-04-17

## 2024-01-09 RX ORDER — TOPIRAMATE 50 MG/1
50 TABLET, FILM COATED ORAL DAILY
COMMUNITY
Start: 2020-07-29 | End: 2024-01-09 | Stop reason: ALTCHOICE

## 2024-01-09 RX ORDER — EPINEPHRINE 0.22MG
100 AEROSOL WITH ADAPTER (ML) INHALATION DAILY
COMMUNITY
Start: 2019-12-02

## 2024-01-09 ASSESSMENT — ENCOUNTER SYMPTOMS
ABDOMINAL PAIN: 0
DIARRHEA: 0
NECK PAIN: 1
COUGH: 0
HEADACHES: 1
VOMITING: 0
RHINORRHEA: 1
SORE THROAT: 0

## 2024-01-09 ASSESSMENT — PAIN SCALES - GENERAL: PAINLEVEL: 0-NO PAIN

## 2024-01-09 NOTE — PROGRESS NOTES
Subjective   Patient ID: Rahul Hernandez is a 63 y.o. male who presents for Follow-up (Chronic pain) and Diabetes.  He has an appointment with ophtho later this month for right eye problem. He feels that the eye problem may be the cause of his frequent dizziness.     Earache   There is pain in both ears. This is a recurrent problem. The current episode started in the past 7 days. The problem occurs constantly. The problem has been unchanged. There has been no fever. The pain is at a severity of 1/10. Associated symptoms include headaches, hearing loss, neck pain and rhinorrhea. Pertinent negatives include no abdominal pain, coughing, diarrhea, ear discharge, rash, sore throat or vomiting.   Diabetes  He presents for his follow-up diabetic visit. He has type 2 diabetes mellitus. Hypoglycemia symptoms include headaches. Risk factors for coronary artery disease include diabetes mellitus, male sex, obesity, dyslipidemia, sedentary lifestyle and hypertension. Current diabetic treatment includes insulin injections and oral agent (monotherapy). He is compliant with treatment all of the time.   Problem resolved with antibiotic prescribed recently.    Current Outpatient Medications   Medication Sig Dispense Refill    albuterol (ProAir HFA) 90 mcg/actuation inhaler Inhale 1 puff every 4 hours if needed for wheezing or shortness of breath.      amitriptyline (Elavil) 25 mg tablet Take 1 tablet (25 mg) by mouth once daily at bedtime.      aspirin 81 mg EC tablet Take 1 tablet (81 mg) by mouth once daily.      atorvastatin (Lipitor) 80 mg tablet Take 1 tablet (80 mg) by mouth once daily at bedtime. 90 tablet 0    carvedilol (Coreg) 3.125 mg tablet Take 1 tablet (3.125 mg) by mouth in the morning and 1 tablet (3.125 mg) in the evening. Take with meals. 180 tablet 3    celecoxib (CeleBREX) 100 mg capsule Take 1 capsule (100 mg) by mouth every 12 hours.      coenzyme Q-10 100 mg capsule Take 1 capsule (100 mg) by mouth once daily.       empagliflozin (Jardiance) 25 mg Take 1 tablet (25 mg) by mouth once daily.      ezetimibe (Zetia) 10 mg tablet Take 1 tablet (10 mg) by mouth once daily. 90 tablet 1    furosemide (Lasix) 40 mg tablet Take 1 tablet (40 mg) by mouth once daily as needed (leg swelling). 90 tablet 3    ibuprofen 800 mg tablet Take 1 tablet (800 mg) by mouth every 8 hours if needed.      insulin detemir (Levemir FlexPen) 100 unit/mL (3 mL) pen Inject 50 Units under the skin once daily at bedtime. 45 mL 3    lidocaine (Lidoderm) 5 % patch Place 1 patch on the skin once daily.      lisinopril 5 mg tablet Take 1 tablet (5 mg) by mouth once daily. 90 tablet 3    OneTouch Ultra Test strip       pantoprazole (ProtoNix) 40 mg EC tablet Take 1 tablet (40 mg) by mouth in the morning and 1 tablet (40 mg) before bedtime. 180 tablet 3    insulin lispro (HumaLOG KwikPen Insulin) 200 unit/mL (3 mL) insulin pen pen INJECT 46 UNITS UNDER THE SKIN THREE TIMES A DAY WITH MEALS 810 mL 3    medical cannabis Medical Marijuana (Not [facility] Prescribed) DO NOT FILL Quantity: 0 Refills: 0 Ordered: 16-Aug-2021 DO Active      tirzepatide (Mounjaro) 2.5 mg/0.5 mL pen injector Inject 2.5 mg under the skin 1 (one) time per week. X 4 weeks then increase to next dose 2 mL 0    tirzepatide (Mounjaro) 5 mg/0.5 mL pen injector Inject 5 mg under the skin 1 (one) time per week. 6 mL 3     No current facility-administered medications for this visit.       Objective     Visit Vitals  /70 (BP Location: Left arm, Patient Position: Sitting, BP Cuff Size: Adult)   Pulse 78   Temp 36 °C (96.8 °F) (Temporal)   Resp 16   Wt 137 kg (302 lb)   SpO2 99%   BMI 43.33 kg/m²   Smoking Status Some Days   BSA 2.6 m²        Constitutional: Well nourished, well developed, appears stated age  Eyes: no scleral icterus, no conjunctival injection  Ears: normal external auditory canal, no retraction or bulging of tympanic membranes  Neck: no thyromegaly  Cardiovascular: regular rate and  rhythm,   Respiratory: normal respiratory effort, clear to auscultation bilaterally  Musculoskeletal: No gross deformities appreciated  Skin: Warm, dry. No rashes  Neurologic: Alert, CNs II-XII grossly intact..  Psych: Appropriate mood and affect.        Assessment/Plan   Problem List Items Addressed This Visit       Class 3 severe obesity due to excess calories with serious comorbidity and body mass index (BMI) of 40.0 to 44.9 in adult (CMS/[facility]) (Chronic)     Up 29 lbs in past 6 months  Denies significant change in diet  Secondary to insulin?         Type 2 diabetes mellitus with hyperglycemia (CMS/[facility]) - Primary (Chronic)     Will have A1C done later this week  Some episodes of hypoglycemia  Continue Humalog TID with meals and Levemir 50 units at bedtime  Rx Dexcom 7 sent to [facility] via [facility]  Add Mounjaro to see if we can get him off Humalog         Relevant Medications    insulin lispro (HumaLOG KwikPen Insulin) 200 unit/mL (3 mL) insulin pen pen    tirzepatide (Mounjaro) 2.5 mg/0.5 mL pen injector    tirzepatide (Mounjaro) 5 mg/0.5 mL pen injector       Follow up 6 months.    Tena Bradley, DO

## 2024-01-09 NOTE — PATIENT INSTRUCTIONS
Thank you for choosing [facility] for your healthcare.   As always if you have any questions or concerns please do not hesitate to call our office at [phone] or through [facility].    Have a great day!  Tena Bradley, DO

## 2024-01-09 NOTE — ASSESSMENT & PLAN NOTE
Will have A1C done later this week  Some episodes of hypoglycemia  Continue Humalog TID with meals and Levemir 50 units at bedtime  Rx Dexcom 7 sent to [facility] via [place]  Add Mounjaro to see if we can get him off Humalog

## 2024-01-12 ENCOUNTER — APPOINTMENT (OUTPATIENT)
Dept: CARDIOLOGY | Facility: HOSPITAL | Age: 64
End: 2024-01-12
Payer: MEDICARE

## 2024-01-16 ENCOUNTER — LAB (OUTPATIENT)
Dept: LAB | Facility: LAB | Age: 64
End: 2024-01-16
Payer: MEDICARE

## 2024-01-16 ENCOUNTER — HOSPITAL ENCOUNTER (OUTPATIENT)
Dept: CARDIOLOGY | Facility: HOSPITAL | Age: 64
Discharge: HOME | End: 2024-01-16
Payer: MEDICARE

## 2024-01-16 DIAGNOSIS — E78.2 MIXED HYPERLIPIDEMIA: ICD-10-CM

## 2024-01-16 DIAGNOSIS — Z95.5 PRESENCE OF STENT IN CORONARY ARTERY: ICD-10-CM

## 2024-01-16 DIAGNOSIS — R42 DIZZINESS: ICD-10-CM

## 2024-01-16 DIAGNOSIS — E11.65 TYPE 2 DIABETES MELLITUS WITH HYPERGLYCEMIA, WITH LONG-TERM CURRENT USE OF INSULIN (MULTI): ICD-10-CM

## 2024-01-16 DIAGNOSIS — R06.02 SHORTNESS OF BREATH: ICD-10-CM

## 2024-01-16 DIAGNOSIS — Z79.4 TYPE 2 DIABETES MELLITUS WITH HYPERGLYCEMIA, WITH LONG-TERM CURRENT USE OF INSULIN (MULTI): ICD-10-CM

## 2024-01-16 LAB
ALBUMIN SERPL BCP-MCNC: 4.1 G/DL (ref 3.4–5)
ALP SERPL-CCNC: 70 U/L (ref 33–136)
ALT SERPL W P-5'-P-CCNC: 16 U/L (ref 10–52)
ANION GAP SERPL CALC-SCNC: 12 MMOL/L (ref 10–20)
AST SERPL W P-5'-P-CCNC: 17 U/L (ref 9–39)
BILIRUB SERPL-MCNC: 1 MG/DL (ref 0–1.2)
BUN SERPL-MCNC: 16 MG/DL (ref 6–23)
CALCIUM SERPL-MCNC: 8.7 MG/DL (ref 8.6–10.3)
CHLORIDE SERPL-SCNC: 105 MMOL/L (ref 98–107)
CHOLEST SERPL-MCNC: 96 MG/DL (ref 0–199)
CHOLESTEROL/HDL RATIO: 2.3
CO2 SERPL-SCNC: 26 MMOL/L (ref 21–32)
CREAT SERPL-MCNC: 0.89 MG/DL (ref 0.5–1.3)
EGFRCR SERPLBLD CKD-EPI 2021: >90 ML/MIN/1.73M*2
ERYTHROCYTE [DISTWIDTH] IN BLOOD BY AUTOMATED COUNT: 12.4 % (ref 11.5–14.5)
EST. AVERAGE GLUCOSE BLD GHB EST-MCNC: 163 MG/DL
GLUCOSE SERPL-MCNC: 225 MG/DL (ref 74–99)
HBA1C MFR BLD: 7.3 %
HCT VFR BLD AUTO: 43 % (ref 41–52)
HDLC SERPL-MCNC: 42.2 MG/DL
HGB BLD-MCNC: 14.7 G/DL (ref 13.5–17.5)
LDLC SERPL CALC-MCNC: 38 MG/DL
MCH RBC QN AUTO: 29.7 PG (ref 26–34)
MCHC RBC AUTO-ENTMCNC: 34.2 G/DL (ref 32–36)
MCV RBC AUTO: 87 FL (ref 80–100)
NON HDL CHOLESTEROL: 54 MG/DL (ref 0–149)
NRBC BLD-RTO: 0 /100 WBCS (ref 0–0)
PLATELET # BLD AUTO: 183 X10*3/UL (ref 150–450)
POTASSIUM SERPL-SCNC: 3.9 MMOL/L (ref 3.5–5.3)
PROT SERPL-MCNC: 6.7 G/DL (ref 6.4–8.2)
RBC # BLD AUTO: 4.95 X10*6/UL (ref 4.5–5.9)
SODIUM SERPL-SCNC: 139 MMOL/L (ref 136–145)
TRIGL SERPL-MCNC: 78 MG/DL (ref 0–149)
VLDL: 16 MG/DL (ref 0–40)
WBC # BLD AUTO: 5.4 X10*3/UL (ref 4.4–11.3)

## 2024-01-16 PROCEDURE — 83036 HEMOGLOBIN GLYCOSYLATED A1C: CPT

## 2024-01-16 PROCEDURE — 85027 COMPLETE CBC AUTOMATED: CPT

## 2024-01-16 PROCEDURE — 2500000004 HC RX 250 GENERAL PHARMACY W/ HCPCS (ALT 636 FOR OP/ED): Performed by: INTERNAL MEDICINE

## 2024-01-16 PROCEDURE — 93306 TTE W/DOPPLER COMPLETE: CPT | Performed by: INTERNAL MEDICINE

## 2024-01-16 PROCEDURE — 80061 LIPID PANEL: CPT

## 2024-01-16 PROCEDURE — 80053 COMPREHEN METABOLIC PANEL: CPT

## 2024-01-16 PROCEDURE — 36415 COLL VENOUS BLD VENIPUNCTURE: CPT

## 2024-01-16 PROCEDURE — 93306 TTE W/DOPPLER COMPLETE: CPT

## 2024-01-16 RX ADMIN — HUMAN ALBUMIN MICROSPHERES AND PERFLUTREN 0.5 ML: 10; .22 INJECTION, SOLUTION INTRAVENOUS at 11:29

## 2024-01-17 LAB
AORTIC VALVE MEAN GRADIENT: 4
AORTIC VALVE PEAK VELOCITY: 1.48
AV PEAK GRADIENT: 8.8
AVA (PEAK VEL): 2.57
AVA (VTI): 3.09
EJECTION FRACTION APICAL 4 CHAMBER: 55
EJECTION FRACTION: 62
LEFT ATRIUM VOLUME AREA LENGTH INDEX BSA: 20.4
LEFT VENTRICLE INTERNAL DIMENSION DIASTOLE: 4.5 (ref 3.5–6)
LEFT VENTRICULAR OUTFLOW TRACT DIAMETER: 2.2
MITRAL VALVE E/A RATIO: 0.9
MITRAL VALVE E/E' RATIO: 9.81
RIGHT VENTRICLE PEAK SYSTOLIC PRESSURE: 37.3

## 2024-02-01 ENCOUNTER — TELEPHONE (OUTPATIENT)
Dept: PRIMARY CARE | Facility: CLINIC | Age: 64
End: 2024-02-01
Payer: MEDICARE

## 2024-02-01 NOTE — TELEPHONE ENCOUNTER
Patient called to ask Dr Bradley to cancel his current script for Mounjaro.  The script cost him $400 out of pocket and he can no longer afford to take it.  Please cancel the remaining refills.

## 2024-02-01 NOTE — TELEPHONE ENCOUNTER
Prior authorization request received via fax for MOUNJARO     Form given to: PLACED IN YOUR BOX ON 2/1/2024

## 2024-03-15 DIAGNOSIS — I25.10 ATHEROSCLEROSIS OF NATIVE CORONARY ARTERY OF NATIVE HEART WITHOUT ANGINA PECTORIS: ICD-10-CM

## 2024-03-15 DIAGNOSIS — E78.5 HYPERLIPIDEMIA, UNSPECIFIED HYPERLIPIDEMIA TYPE: ICD-10-CM

## 2024-03-15 PROBLEM — G47.30 SLEEP APNEA: Status: ACTIVE | Noted: 2024-03-15

## 2024-03-18 RX ORDER — ATORVASTATIN CALCIUM 80 MG/1
80 TABLET, FILM COATED ORAL NIGHTLY
Qty: 90 TABLET | Refills: 3 | Status: SHIPPED | OUTPATIENT
Start: 2024-03-18

## 2024-03-21 DIAGNOSIS — I10 BENIGN ESSENTIAL HYPERTENSION: ICD-10-CM

## 2024-03-21 DIAGNOSIS — E11.65 TYPE 2 DIABETES MELLITUS WITH HYPERGLYCEMIA, WITH LONG-TERM CURRENT USE OF INSULIN (MULTI): ICD-10-CM

## 2024-03-21 DIAGNOSIS — Z79.4 TYPE 2 DIABETES MELLITUS WITH HYPERGLYCEMIA, WITH LONG-TERM CURRENT USE OF INSULIN (MULTI): ICD-10-CM

## 2024-03-21 RX ORDER — LISINOPRIL 5 MG/1
5 TABLET ORAL DAILY
Qty: 90 TABLET | Refills: 3 | Status: SHIPPED | OUTPATIENT
Start: 2024-03-21 | End: 2025-03-21

## 2024-05-03 ENCOUNTER — TELEPHONE (OUTPATIENT)
Dept: PRIMARY CARE | Facility: CLINIC | Age: 64
End: 2024-05-03
Payer: MEDICARE

## 2024-05-03 DIAGNOSIS — J06.9 ACUTE URI: Primary | ICD-10-CM

## 2024-05-03 RX ORDER — AMOXICILLIN AND CLAVULANATE POTASSIUM 875; 125 MG/1; MG/1
875 TABLET, FILM COATED ORAL 2 TIMES DAILY
Qty: 14 TABLET | Refills: 0 | Status: SHIPPED | OUTPATIENT
Start: 2024-05-03 | End: 2024-05-10

## 2024-05-03 NOTE — TELEPHONE ENCOUNTER
"How long have you been sick? 1 month  Cough (productive or nonproductive)?no  Color of phlegm?no  Sinus pain? yes  Sinus drainage/color? yellow  Earache?both ears  Congestion?yes  Headache?yes  Fever (if yes, temp)?no  Sore throat?yes  Short of breath/wheezing?no  OTC medication? Sin-off sinus medication     Nothing is helping him.  He feels like it is a sinus infection.  \"I get them this time every year with the change of weather\"- per pt  He was wondering if you would be able get an antibiotic.  Thanks:)    [facility] [place]    "

## 2024-07-24 ENCOUNTER — APPOINTMENT (OUTPATIENT)
Dept: PRIMARY CARE | Facility: CLINIC | Age: 64
End: 2024-07-24
Payer: MEDICARE

## 2024-07-24 VITALS
WEIGHT: 314 LBS | RESPIRATION RATE: 20 BRPM | BODY MASS INDEX: 44.95 KG/M2 | TEMPERATURE: 96.8 F | DIASTOLIC BLOOD PRESSURE: 83 MMHG | OXYGEN SATURATION: 100 % | HEART RATE: 78 BPM | HEIGHT: 70 IN | SYSTOLIC BLOOD PRESSURE: 155 MMHG

## 2024-07-24 DIAGNOSIS — M25.50 POLYARTHRALGIA: ICD-10-CM

## 2024-07-24 DIAGNOSIS — I10 BENIGN ESSENTIAL HYPERTENSION: ICD-10-CM

## 2024-07-24 DIAGNOSIS — Z00.00 ROUTINE GENERAL MEDICAL EXAMINATION AT HEALTH CARE FACILITY: Primary | ICD-10-CM

## 2024-07-24 DIAGNOSIS — E11.65 TYPE 2 DIABETES MELLITUS WITH HYPERGLYCEMIA, WITH LONG-TERM CURRENT USE OF INSULIN (MULTI): ICD-10-CM

## 2024-07-24 DIAGNOSIS — Z79.4 TYPE 2 DIABETES MELLITUS WITH HYPERGLYCEMIA, WITH LONG-TERM CURRENT USE OF INSULIN (MULTI): ICD-10-CM

## 2024-07-24 PROCEDURE — 3008F BODY MASS INDEX DOCD: CPT | Performed by: FAMILY MEDICINE

## 2024-07-24 PROCEDURE — 3077F SYST BP >= 140 MM HG: CPT | Performed by: FAMILY MEDICINE

## 2024-07-24 PROCEDURE — 4010F ACE/ARB THERAPY RXD/TAKEN: CPT | Performed by: FAMILY MEDICINE

## 2024-07-24 PROCEDURE — 95249 CONT GLUC MNTR PT PROV EQP: CPT | Performed by: FAMILY MEDICINE

## 2024-07-24 PROCEDURE — 99213 OFFICE O/P EST LOW 20 MIN: CPT | Performed by: FAMILY MEDICINE

## 2024-07-24 PROCEDURE — 3079F DIAST BP 80-89 MM HG: CPT | Performed by: FAMILY MEDICINE

## 2024-07-24 PROCEDURE — 1036F TOBACCO NON-USER: CPT | Performed by: FAMILY MEDICINE

## 2024-07-24 PROCEDURE — 3051F HG A1C>EQUAL 7.0%<8.0%: CPT | Performed by: FAMILY MEDICINE

## 2024-07-24 PROCEDURE — 3048F LDL-C <100 MG/DL: CPT | Performed by: FAMILY MEDICINE

## 2024-07-24 PROCEDURE — G0402 INITIAL PREVENTIVE EXAM: HCPCS | Performed by: FAMILY MEDICINE

## 2024-07-24 RX ORDER — LISINOPRIL 20 MG/1
20 TABLET ORAL DAILY
Qty: 90 TABLET | Refills: 3 | Status: SHIPPED | OUTPATIENT
Start: 2024-07-24 | End: 2025-07-24

## 2024-07-24 RX ORDER — CELECOXIB 200 MG/1
200 CAPSULE ORAL DAILY PRN
Qty: 30 CAPSULE | Refills: 5 | Status: SHIPPED | OUTPATIENT
Start: 2024-07-24 | End: 2025-01-20

## 2024-07-24 RX ORDER — IBUPROFEN 800 MG/1
800 TABLET ORAL EVERY 8 HOURS PRN
COMMUNITY
Start: 2024-06-13 | End: 2024-07-24 | Stop reason: WASHOUT

## 2024-07-24 RX ORDER — LIDOCAINE 50 MG/G
1 PATCH TOPICAL
COMMUNITY
Start: 2024-06-13 | End: 2024-12-10

## 2024-07-24 SDOH — ECONOMIC STABILITY: FOOD INSECURITY: WITHIN THE PAST 12 MONTHS, THE FOOD YOU BOUGHT JUST DIDN'T LAST AND YOU DIDN'T HAVE MONEY TO GET MORE.: NEVER TRUE

## 2024-07-24 SDOH — ECONOMIC STABILITY: FOOD INSECURITY: WITHIN THE PAST 12 MONTHS, YOU WORRIED THAT YOUR FOOD WOULD RUN OUT BEFORE YOU GOT MONEY TO BUY MORE.: NEVER TRUE

## 2024-07-24 ASSESSMENT — ENCOUNTER SYMPTOMS
LOSS OF SENSATION IN FEET: 0
OCCASIONAL FEELINGS OF UNSTEADINESS: 1
DEPRESSION: 0

## 2024-07-24 ASSESSMENT — LIFESTYLE VARIABLES
HOW OFTEN DO YOU HAVE A DRINK CONTAINING ALCOHOL: NEVER
SKIP TO QUESTIONS 9-10: 1
AUDIT-C TOTAL SCORE: 0
HOW MANY STANDARD DRINKS CONTAINING ALCOHOL DO YOU HAVE ON A TYPICAL DAY: PATIENT DOES NOT DRINK
HOW OFTEN DO YOU HAVE SIX OR MORE DRINKS ON ONE OCCASION: NEVER

## 2024-07-24 ASSESSMENT — ACTIVITIES OF DAILY LIVING (ADL)
MANAGING_FINANCES: INDEPENDENT
GROCERY_SHOPPING: INDEPENDENT
BATHING: INDEPENDENT
DOING_HOUSEWORK: INDEPENDENT
DRESSING: INDEPENDENT
TAKING_MEDICATION: INDEPENDENT

## 2024-07-24 ASSESSMENT — PAIN SCALES - GENERAL: PAINLEVEL: 6

## 2024-07-24 ASSESSMENT — PATIENT HEALTH QUESTIONNAIRE - PHQ9
2. FEELING DOWN, DEPRESSED OR HOPELESS: NOT AT ALL
1. LITTLE INTEREST OR PLEASURE IN DOING THINGS: NOT AT ALL
SUM OF ALL RESPONSES TO PHQ9 QUESTIONS 1 & 2: 0

## 2024-07-24 NOTE — PATIENT INSTRUCTIONS
Thank you for choosing [facility] for your healthcare.   As always if you have any questions or concerns please do not hesitate to call our office at [phone] or through [employer].    Have a great day!  Tena Bradley, DO

## 2024-07-24 NOTE — PROGRESS NOTES
1. Please do your breast exam every mo, when you  Change the  calendar page or set an alarm on your cell phone Do a  visual check for dimples, inversion or indentation or any different position of the nipple Feel manually  for any 1cm or larger  size mass ie about the size of an almond Be sure to cover the entire area of both breasts : this extends back to the back on either side and from the collar bone to the bottom of the breasts where you can begin to feel ribs.  And check the testicles for cancer also then     2. Boil water and breath the warm vapors 2-3 times a day to try to open up the sinuses. Run a steamer or cold air vaporizer as much as possible. Take Mucinex plain blue  1200 mg (This may come as 600mg/tablet and you need to take 2 tabs twice a day) twice a day. Mucinex is guaifenesin, the major component of most cough syrups, because it makes the mucus less thick, and therefore it drains out better and you are less likely to cough from it dripping on the back of your throat.  Irrigate the  nose with plain water under the kitchen sink faucet or the shower.  Luma pots, spray bottles, etc accumulate bacteria and are not recommended.   The tickle in the throat is also helped by gargling with vinegar and honey mixture, or pop or mouth wash as these coat the throat.  Please try to rinse teeth with water after using these .     3.  Consider antihistamines= antiallergens:fro least to most powerful and from least to most drowsy :   Loratadine, clariten, alavert        Loratadine, clariten, alavert 10mgm a day        Fexofenadine= allegra 180mgm a day                                                                                                                                                       Cetirizine=zyrtec  10mgm a day###        Benadryl=diphenhydramine  25-50 mgm- only at bedtime-can be used together with one of the above taken during the day    4. If still trouble, go to [facility]  Subjective   Patient ID: Rahul Hernnadez is a 64 y.o. male who presents for Welcome To Medicare (Failed eye exam due to blurry vision from eye injections-could not read any line/Also declines EKG states he just had one recently ).  He is concerned that his elevated blood pressure is causing there intermittent dizziness he has been experiencing.     Diabetes  Currently on Humalog TID. Mounjaro was too expensive.     He is concerned about wide spread joint pain.         Patient Care Team:  Tena Bradley DO as PCP - General  Tena Bradley DO as PCP - [employer] PCP  Jerad Leahy MD as Consulting Physician (Cardiology)  JULIANNE Naranjo-CNP as Nurse Practitioner (Pain Medicine)  Jarod Peterson DC as Consulting Physician (Chiropractic Medicine)  Maritza Armas PA-C as Physician Assistant (Gastroenterology)    Current Outpatient Medications   Medication Sig Dispense Refill    aspirin 81 mg EC tablet Take 1 tablet (81 mg) by mouth once daily.      atorvastatin (Lipitor) 80 mg tablet TAKE ONE TABLET BY MOUTH ONCE DAILY AT BEDTIME 90 tablet 3    carvedilol (Coreg) 3.125 mg tablet Take 1 tablet (3.125 mg) by mouth in the morning and 1 tablet (3.125 mg) in the evening. Take with meals. 180 tablet 3    coenzyme Q-10 100 mg capsule Take 1 capsule (100 mg) by mouth once daily.      ezetimibe (Zetia) 10 mg tablet Take 1 tablet (10 mg) by mouth once daily. 90 tablet 1    furosemide (Lasix) 40 mg tablet Take 1 tablet (40 mg) by mouth once daily as needed (leg swelling). 90 tablet 3    insulin lispro (HumaLOG KwikPen Insulin) 200 unit/mL (3 mL) insulin pen pen INJECT 46 UNITS UNDER THE SKIN THREE TIMES A DAY WITH MEALS 810 mL 3    lidocaine (Lidoderm) 5 % patch Place 1 patch on the skin once daily.      OneTouch Ultra Test strip       pantoprazole (ProtoNix) 40 mg EC tablet Take 1 tablet (40 mg) by mouth in the morning and 1 tablet (40 mg) before bedtime. 180 tablet 3    celecoxib (CeleBREX) 200      5. Do the testicle US  At [facility]   [phone]  At  [address] [facility]    6. No difference was  Noted by patients in a double blind study when given codeine, tylenol ( acetaminophen) or ibuprofen (all in identical pills). They felt no difference in pain relief. Since ibuprofen and the NSAIDs  causes kidney damage, esophageal damage with heartburn, and can increase the risk of esophageal and stomach cancer and ulcers,and colonic strictures. They also cause increased risk of heart attack .   I recommend that you use tylenol(acetaminophen) for pain. Use the acetaminophen ES  Which has 500mgm/tablet You can take up to 2 tablets 4 times a day as need for pain.  If this is not enough, you can add in ibuprofen or aleve(naprosyn) with 2 glasses of fluid and some food-to protect the stomach and esophagus. Please let us know if you are continuing to take ibuprofen or aleve, as we will need to periodically check your kidney function with a blood test.     "mg capsule Take 1 capsule (200 mg) by mouth once daily as needed (joint pain). 30 capsule 5    lisinopril 20 mg tablet Take 1 tablet (20 mg) by mouth once daily. 90 tablet 3     No current facility-administered medications for this visit.       Objective     Visit Vitals  /83 (BP Location: Left arm, Patient Position: Sitting, BP Cuff Size: Adult long)   Pulse 78   Temp 36 °C (96.8 °F) (Temporal)   Resp 20   Ht 1.778 m (5' 10\")   Wt 142 kg (314 lb)   SpO2 100%   BMI 45.05 kg/m²   Smoking Status Former   BSA 2.65 m²        Constitutional: Well nourished, well developed, appears stated age  Eyes: no scleral icterus, no conjunctival injection  Ears: normal external auditory canal, no retraction or bulging of tympanic membranes  Neck: no thyromegaly  Cardiovascular: regular rate and rhythm, no leg edema  Respiratory: normal respiratory effort, clear to auscultation bilaterally  Skin: Warm, dry. No rashes  Neurologic: Alert, CNs II-XII grossly intact..  Psych: Appropriate mood and affect.        Assessment/Plan   Problem List Items Addressed This Visit       Benign essential hypertension (Chronic)     Subotpimal control  Increase lisinopril to 20 mg          Relevant Medications    lisinopril 20 mg tablet    Other Relevant Orders    CBC    Type 2 diabetes mellitus with hyperglycemia (Multi) (Chronic)    Relevant Orders    Comprehensive Metabolic Panel    Hemoglobin A1C    Albumin-Creatinine Ratio, Urine Random     Other Visit Diagnoses       Routine general medical examination at health care facility    -  Primary    Colonoscopy current  Will need [id number] at age 66    Polyarthralgia        Will have him try Celebrex    Relevant Medications    celecoxib (CeleBREX) 200 mg capsule            All pertinent lab work and results were reviewed with patient.     Follow up 3 months.    Tena Bradley,   "

## 2024-07-26 ENCOUNTER — TELEPHONE (OUTPATIENT)
Dept: PRIMARY CARE | Facility: CLINIC | Age: 64
End: 2024-07-26
Payer: MEDICARE

## 2024-08-01 ENCOUNTER — TELEPHONE (OUTPATIENT)
Dept: PRIMARY CARE | Facility: CLINIC | Age: 64
End: 2024-08-01
Payer: MEDICARE

## 2024-08-01 NOTE — TELEPHONE ENCOUNTER
Prior authorization request received via fax for [employer]     Form given to: PLACED IN LEAD MA'S BOX ON 8/1/24

## 2024-08-05 DIAGNOSIS — Z79.4 TYPE 2 DIABETES MELLITUS WITH HYPERGLYCEMIA, WITH LONG-TERM CURRENT USE OF INSULIN (MULTI): Chronic | ICD-10-CM

## 2024-08-05 DIAGNOSIS — E11.65 TYPE 2 DIABETES MELLITUS WITH HYPERGLYCEMIA, WITH LONG-TERM CURRENT USE OF INSULIN (MULTI): Chronic | ICD-10-CM

## 2024-08-05 RX ORDER — BLOOD-GLUCOSE SENSOR
EACH MISCELLANEOUS
Qty: 9 EACH | Refills: 3 | Status: SHIPPED | OUTPATIENT
Start: 2024-08-05

## 2024-08-14 ENCOUNTER — TELEPHONE (OUTPATIENT)
Dept: PRIMARY CARE | Facility: CLINIC | Age: 64
End: 2024-08-14
Payer: MEDICARE

## 2024-08-14 DIAGNOSIS — Z79.4 TYPE 2 DIABETES MELLITUS WITH HYPERGLYCEMIA, WITH LONG-TERM CURRENT USE OF INSULIN (MULTI): Primary | Chronic | ICD-10-CM

## 2024-08-14 DIAGNOSIS — E11.65 TYPE 2 DIABETES MELLITUS WITH HYPERGLYCEMIA, WITH LONG-TERM CURRENT USE OF INSULIN (MULTI): Primary | Chronic | ICD-10-CM

## 2024-08-14 RX ORDER — INSULIN LISPRO 200 [IU]/ML
46 INJECTION, SOLUTION SUBCUTANEOUS
Qty: 30 ML | Refills: 0 | Status: SHIPPED | OUTPATIENT
Start: 2024-08-14

## 2024-08-14 NOTE — TELEPHONE ENCOUNTER
Patient called in stating that he is running out of his insulin lispro (HumaLOG KwikPen Insulin) 200 unit/mL (3 mL) insulin pen pen  and the mail order wont be in until around the 26th of August. Patient would like to know if possible if he can get a short supply (5 boxes) of his insulin lispro (HumaLOG KwikPen Insulin) 200 unit/mL (3 mL) insulin pen pen sent to [facility] in [place].

## 2024-08-15 NOTE — TELEPHONE ENCOUNTER
Patient returned call, notified of message, patient expressed verbal understanding had no questions at this time.

## 2024-08-20 DIAGNOSIS — E78.2 MIXED HYPERLIPIDEMIA: ICD-10-CM

## 2024-08-20 RX ORDER — EZETIMIBE 10 MG/1
10 TABLET ORAL DAILY
Qty: 90 TABLET | Refills: 3 | Status: SHIPPED | OUTPATIENT
Start: 2024-08-20 | End: 2025-08-20

## 2024-08-26 ENCOUNTER — TELEPHONE (OUTPATIENT)
Dept: PRIMARY CARE | Facility: CLINIC | Age: 64
End: 2024-08-26
Payer: MEDICARE

## 2024-08-26 DIAGNOSIS — E11.65 TYPE 2 DIABETES MELLITUS WITH HYPERGLYCEMIA, WITH LONG-TERM CURRENT USE OF INSULIN (MULTI): Chronic | ICD-10-CM

## 2024-08-26 DIAGNOSIS — Z79.4 TYPE 2 DIABETES MELLITUS WITH HYPERGLYCEMIA, WITH LONG-TERM CURRENT USE OF INSULIN (MULTI): Chronic | ICD-10-CM

## 2024-08-26 NOTE — TELEPHONE ENCOUNTER
Patient called in stating that he needs a 90 day supply of his Humalog sent to [facility] in [place] instead of the mail order pharmacy.

## 2024-08-27 RX ORDER — INSULIN LISPRO 200 [IU]/ML
46 INJECTION, SOLUTION SUBCUTANEOUS
Qty: 30 ML | Refills: 3 | Status: SHIPPED | OUTPATIENT
Start: 2024-08-27

## 2024-10-14 ENCOUNTER — TELEPHONE (OUTPATIENT)
Dept: PRIMARY CARE | Facility: CLINIC | Age: 64
End: 2024-10-14
Payer: MEDICARE

## 2024-10-14 DIAGNOSIS — I10 BENIGN ESSENTIAL HYPERTENSION: ICD-10-CM

## 2024-10-14 DIAGNOSIS — K21.00 GASTROESOPHAGEAL REFLUX DISEASE WITH ESOPHAGITIS WITHOUT HEMORRHAGE: ICD-10-CM

## 2024-10-14 DIAGNOSIS — I25.10 ATHEROSCLEROSIS OF NATIVE CORONARY ARTERY OF NATIVE HEART WITHOUT ANGINA PECTORIS: ICD-10-CM

## 2024-10-14 RX ORDER — PANTOPRAZOLE SODIUM 40 MG/1
40 TABLET, DELAYED RELEASE ORAL 2 TIMES DAILY
Qty: 180 TABLET | Refills: 3 | Status: SHIPPED | OUTPATIENT
Start: 2024-10-14 | End: 2025-10-14

## 2024-10-14 RX ORDER — CARVEDILOL 3.12 MG/1
3.12 TABLET ORAL 2 TIMES DAILY
Qty: 180 TABLET | Refills: 1 | Status: SHIPPED | OUTPATIENT
Start: 2024-10-14

## 2024-10-14 NOTE — TELEPHONE ENCOUNTER
Patient called in for a fill on pantoprazole. He has a new pharmacy and they are unable to pull this over due to it being .     Pharmacy: [facility], [place]

## 2024-10-15 ENCOUNTER — LAB (OUTPATIENT)
Dept: LAB | Facility: LAB | Age: 64
End: 2024-10-15
Payer: MEDICARE

## 2024-10-15 DIAGNOSIS — E11.65 TYPE 2 DIABETES MELLITUS WITH HYPERGLYCEMIA, WITH LONG-TERM CURRENT USE OF INSULIN: ICD-10-CM

## 2024-10-15 DIAGNOSIS — Z79.4 TYPE 2 DIABETES MELLITUS WITH HYPERGLYCEMIA, WITH LONG-TERM CURRENT USE OF INSULIN: ICD-10-CM

## 2024-10-15 DIAGNOSIS — I10 BENIGN ESSENTIAL HYPERTENSION: ICD-10-CM

## 2024-10-15 LAB
ALBUMIN SERPL BCP-MCNC: 4 G/DL (ref 3.4–5)
ALP SERPL-CCNC: 66 U/L (ref 33–136)
ALT SERPL W P-5'-P-CCNC: 20 U/L (ref 10–52)
ANION GAP SERPL CALC-SCNC: 12 MMOL/L (ref 10–20)
AST SERPL W P-5'-P-CCNC: 19 U/L (ref 9–39)
BILIRUB SERPL-MCNC: 0.6 MG/DL (ref 0–1.2)
BUN SERPL-MCNC: 18 MG/DL (ref 6–23)
CALCIUM SERPL-MCNC: 8.8 MG/DL (ref 8.6–10.3)
CHLORIDE SERPL-SCNC: 105 MMOL/L (ref 98–107)
CO2 SERPL-SCNC: 28 MMOL/L (ref 21–32)
CREAT SERPL-MCNC: 0.93 MG/DL (ref 0.5–1.3)
EGFRCR SERPLBLD CKD-EPI 2021: >90 ML/MIN/1.73M*2
ERYTHROCYTE [DISTWIDTH] IN BLOOD BY AUTOMATED COUNT: 12.5 % (ref 11.5–14.5)
GLUCOSE SERPL-MCNC: 104 MG/DL (ref 74–99)
HCT VFR BLD AUTO: 44.8 % (ref 41–52)
HGB BLD-MCNC: 15.2 G/DL (ref 13.5–17.5)
MCH RBC QN AUTO: 30.2 PG (ref 26–34)
MCHC RBC AUTO-ENTMCNC: 33.9 G/DL (ref 32–36)
MCV RBC AUTO: 89 FL (ref 80–100)
NRBC BLD-RTO: 0 /100 WBCS (ref 0–0)
PLATELET # BLD AUTO: 173 X10*3/UL (ref 150–450)
POTASSIUM SERPL-SCNC: 3.9 MMOL/L (ref 3.5–5.3)
PROT SERPL-MCNC: 6.4 G/DL (ref 6.4–8.2)
RBC # BLD AUTO: 5.03 X10*6/UL (ref 4.5–5.9)
SODIUM SERPL-SCNC: 141 MMOL/L (ref 136–145)
WBC # BLD AUTO: 6.8 X10*3/UL (ref 4.4–11.3)

## 2024-10-15 PROCEDURE — 36415 COLL VENOUS BLD VENIPUNCTURE: CPT

## 2024-10-15 PROCEDURE — 80053 COMPREHEN METABOLIC PANEL: CPT

## 2024-10-15 PROCEDURE — 83036 HEMOGLOBIN GLYCOSYLATED A1C: CPT

## 2024-10-15 PROCEDURE — 85027 COMPLETE CBC AUTOMATED: CPT

## 2024-10-16 LAB
EST. AVERAGE GLUCOSE BLD GHB EST-MCNC: 148 MG/DL
HBA1C MFR BLD: 6.8 %

## 2024-10-21 NOTE — PATIENT INSTRUCTIONS
Thank you for choosing [facility] for your healthcare.   As always if you have any questions or concerns please do not hesitate to call our office at [phone] or through [facility].    Have a great day!  Tena Bradley, DO

## 2024-10-21 NOTE — ASSESSMENT & PLAN NOTE
Controlled per recent A1c  Some episodes of hypoglycemia during the night, discussed spoonful of peanut butter at bedtime  Continue Humalog TID with meals and   Continue Dexcom G7, patient is benefiting from use      Orders:    insulin lispro (HumaLOG KwikPen Insulin) 200 unit/mL (3 mL) insulin pen pen; Inject 46 Units under the skin 3 times daily (morning, midday, late afternoon). WITH your breakfast, lunch, and dinner. Take 20 units with your evening snack.    Comprehensive Metabolic Panel; Future    Hemoglobin A1C; Future

## 2024-10-22 ENCOUNTER — LAB (OUTPATIENT)
Dept: LAB | Facility: LAB | Age: 64
End: 2024-10-22
Payer: MEDICARE

## 2024-10-22 ENCOUNTER — APPOINTMENT (OUTPATIENT)
Dept: PRIMARY CARE | Facility: CLINIC | Age: 64
End: 2024-10-22
Payer: MEDICARE

## 2024-10-22 VITALS
TEMPERATURE: 97.3 F | HEIGHT: 70 IN | OXYGEN SATURATION: 95 % | BODY MASS INDEX: 45.1 KG/M2 | RESPIRATION RATE: 20 BRPM | SYSTOLIC BLOOD PRESSURE: 166 MMHG | HEART RATE: 84 BPM | WEIGHT: 315 LBS | DIASTOLIC BLOOD PRESSURE: 76 MMHG

## 2024-10-22 DIAGNOSIS — M48.02 SPINAL STENOSIS OF CERVICAL REGION: ICD-10-CM

## 2024-10-22 DIAGNOSIS — E11.65 TYPE 2 DIABETES MELLITUS WITH HYPERGLYCEMIA, WITH LONG-TERM CURRENT USE OF INSULIN: ICD-10-CM

## 2024-10-22 DIAGNOSIS — Z23 NEED FOR VACCINATION FOR H FLU TYPE B: ICD-10-CM

## 2024-10-22 DIAGNOSIS — Z79.4 TYPE 2 DIABETES MELLITUS WITH HYPERGLYCEMIA, WITH LONG-TERM CURRENT USE OF INSULIN: ICD-10-CM

## 2024-10-22 DIAGNOSIS — Z79.4 TYPE 2 DIABETES MELLITUS WITH HYPERGLYCEMIA, WITH LONG-TERM CURRENT USE OF INSULIN: Primary | Chronic | ICD-10-CM

## 2024-10-22 DIAGNOSIS — E11.65 TYPE 2 DIABETES MELLITUS WITH HYPERGLYCEMIA, WITH LONG-TERM CURRENT USE OF INSULIN: Primary | Chronic | ICD-10-CM

## 2024-10-22 LAB
CREAT UR-MCNC: 111.6 MG/DL (ref 20–370)
MICROALBUMIN UR-MCNC: <7 MG/L
MICROALBUMIN/CREAT UR: NORMAL MG/G{CREAT}

## 2024-10-22 PROCEDURE — 1036F TOBACCO NON-USER: CPT | Performed by: FAMILY MEDICINE

## 2024-10-22 PROCEDURE — 90656 IIV3 VACC NO PRSV 0.5 ML IM: CPT | Performed by: FAMILY MEDICINE

## 2024-10-22 PROCEDURE — G0008 ADMIN INFLUENZA VIRUS VAC: HCPCS | Performed by: FAMILY MEDICINE

## 2024-10-22 PROCEDURE — 3044F HG A1C LEVEL LT 7.0%: CPT | Performed by: FAMILY MEDICINE

## 2024-10-22 PROCEDURE — 3078F DIAST BP <80 MM HG: CPT | Performed by: FAMILY MEDICINE

## 2024-10-22 PROCEDURE — 99214 OFFICE O/P EST MOD 30 MIN: CPT | Performed by: FAMILY MEDICINE

## 2024-10-22 PROCEDURE — 82570 ASSAY OF URINE CREATININE: CPT

## 2024-10-22 PROCEDURE — 3077F SYST BP >= 140 MM HG: CPT | Performed by: FAMILY MEDICINE

## 2024-10-22 PROCEDURE — 4010F ACE/ARB THERAPY RXD/TAKEN: CPT | Performed by: FAMILY MEDICINE

## 2024-10-22 PROCEDURE — 3048F LDL-C <100 MG/DL: CPT | Performed by: FAMILY MEDICINE

## 2024-10-22 PROCEDURE — 82043 UR ALBUMIN QUANTITATIVE: CPT

## 2024-10-22 PROCEDURE — 3008F BODY MASS INDEX DOCD: CPT | Performed by: FAMILY MEDICINE

## 2024-10-22 RX ORDER — TOPIRAMATE 50 MG/1
50 TABLET, FILM COATED ORAL NIGHTLY
Qty: 90 TABLET | Refills: 3 | Status: SHIPPED | OUTPATIENT
Start: 2024-10-22

## 2024-10-22 RX ORDER — INSULIN LISPRO 200 [IU]/ML
46 INJECTION, SOLUTION SUBCUTANEOUS
Qty: 72 ML | Refills: 3 | Status: SHIPPED | OUTPATIENT
Start: 2024-10-22 | End: 2025-10-22

## 2024-10-22 RX ORDER — INSULIN LISPRO 200 [IU]/ML
46 INJECTION, SOLUTION SUBCUTANEOUS
Qty: 30 ML | Refills: 7 | Status: SHIPPED | OUTPATIENT
Start: 2024-10-22 | End: 2024-10-22 | Stop reason: SDUPTHER

## 2024-10-22 ASSESSMENT — ENCOUNTER SYMPTOMS
OCCASIONAL FEELINGS OF UNSTEADINESS: 0
DEPRESSION: 0
LOSS OF SENSATION IN FEET: 0

## 2024-10-22 ASSESSMENT — PAIN SCALES - GENERAL: PAINLEVEL_OUTOF10: 6

## 2024-10-22 NOTE — ASSESSMENT & PLAN NOTE
Orders:    topiramate (Topamax) 50 mg tablet; Take 1 tablet (50 mg) by mouth once daily at bedtime.

## 2024-10-22 NOTE — PROGRESS NOTES
Subjective   Patient ID: Rahul Hernandez is a 64 y.o. male who presents for Diabetes.  Diabetes  He is using his Dexcom G7 meter regularly.  His meter was reviewed and in the past 90 days he has been in the range 68% of the time.  He has been high 24% of the time and very high 3% of the time.  He has the meter set to alert him for readings below 70.  He has been low 4% of the time and very low 1% of the time.  The low readings occur during the night.  His meter alarms him and wakes him up.    He is seeing pain management for chronic neck and back pain.  They switched him from Celebrex to Daypro a few weeks ago to see if that helps.  He is scheduled for neck injections sometime soon.        Patient Care Team:  Tena Bradley DO as PCP - General  Tena Bradley DO as PCP - [employer] PCP  Jerad Leahy MD as Consulting Physician (Cardiology)  JULIANNE Naranjo-CNP as Nurse Practitioner (Pain Medicine)  Jarod Peterson DC as Consulting Physician (Chiropractic Medicine)  Maritza Armas PA-C as Physician Assistant (Gastroenterology)    Current Outpatient Medications   Medication Sig Dispense Refill    aspirin 81 mg EC tablet Take 1 tablet (81 mg) by mouth once daily.      atorvastatin (Lipitor) 80 mg tablet TAKE ONE TABLET BY MOUTH ONCE DAILY AT BEDTIME 90 tablet 3    carvedilol (Coreg) 3.125 mg tablet TAKE ONE TABLET BY MOUTH TWO TIMES A  tablet 1    coenzyme Q-10 100 mg capsule Take 1 capsule (100 mg) by mouth once daily.      Dexcom G7 Sensor device Use to check blood glucose before administration of insulin 9 each 3    ezetimibe (Zetia) 10 mg tablet Take 1 tablet (10 mg) by mouth once daily. 90 tablet 3    furosemide (Lasix) 40 mg tablet Take 1 tablet (40 mg) by mouth once daily as needed (leg swelling). 90 tablet 3    lidocaine (Lidoderm) 5 % patch Place 1 patch on the skin once daily.      lisinopril 20 mg tablet Take 1 tablet (20 mg) by mouth once daily. 90 tablet 3     "OneTouch Ultra Test strip       pantoprazole (ProtoNix) 40 mg EC tablet Take 1 tablet (40 mg) by mouth 2 times a day. 180 tablet 3    insulin lispro (HumaLOG KwikPen Insulin) 200 unit/mL (3 mL) insulin pen pen Inject 46 Units under the skin 3 times daily (morning, midday, late afternoon). WITH your breakfast, lunch, and dinner. Take 20 units with your evening snack. 72 mL 3    topiramate (Topamax) 50 mg tablet Take 1 tablet (50 mg) by mouth once daily at bedtime. 90 tablet 3     No current facility-administered medications for this visit.       Objective     Visit Vitals  /76 (BP Location: Left arm, Patient Position: Sitting, BP Cuff Size: Large adult)   Pulse 84   Temp 36.3 °C (97.3 °F) (Temporal)   Resp 20   Ht 1.778 m (5' 10\")   Wt (!) 152 kg (334 lb)   SpO2 95%   BMI 47.92 kg/m²   Smoking Status Former   BSA 2.74 m²        Constitutional: Well nourished, well developed, appears stated age  Eyes: no scleral icterus, no conjunctival injection  Respiratory: normal respiratory effort  Skin: Warm, dry.  Neurologic: Alert, CNs II-XII grossly intact..  Psych: Appropriate mood and affect.         Assessment & Plan  Type 2 diabetes mellitus with hyperglycemia, with long-term current use of insulin  Controlled per recent A1c  Some episodes of hypoglycemia during the night, discussed spoonful of peanut butter at bedtime  Continue Humalog TID with meals and   Continue Dexcom G7, patient is benefiting from use      Orders:    insulin lispro (HumaLOG KwikPen Insulin) 200 unit/mL (3 mL) insulin pen pen; Inject 46 Units under the skin 3 times daily (morning, midday, late afternoon). WITH your breakfast, lunch, and dinner. Take 20 units with your evening snack.    Comprehensive Metabolic Panel; Future    Hemoglobin A1C; Future    Need for vaccination for H flu type B    Orders:    Flu vaccine, trivalent, preservative free, age 6 months and greater (Fluarix/Fluzone/Flulaval)    Spinal stenosis of cervical " region    Orders:    topiramate (Topamax) 50 mg tablet; Take 1 tablet (50 mg) by mouth once daily at bedtime.       All pertinent lab work and results were reviewed with patient.     Follow up 3 months. Previous HA and dizziness resolved.    Tena Bradley, DO

## 2024-10-22 NOTE — ASSESSMENT & PLAN NOTE
Up 20 lbs in past 3 months  Denies significant change in diet  Secondary to insulin?  Rx topiramate at bedtime to see if that helps  GLP1 for DM was too expensive

## 2024-12-30 ENCOUNTER — TELEPHONE (OUTPATIENT)
Dept: PRIMARY CARE | Facility: CLINIC | Age: 64
End: 2024-12-30
Payer: MEDICARE

## 2025-01-22 RX ORDER — OXAPROZIN 600 MG/1
1 TABLET, FILM COATED ORAL
COMMUNITY
Start: 2024-12-04 | End: 2025-01-23 | Stop reason: WASHOUT

## 2025-01-22 NOTE — PATIENT INSTRUCTIONS
Thank you for choosing [facility] for your healthcare.   As always if you have any questions or concerns please do not hesitate to call our office at [phone] or through [employer].    Have a great day!  Tena Bradlye, DO

## 2025-01-23 ENCOUNTER — APPOINTMENT (OUTPATIENT)
Dept: PRIMARY CARE | Facility: CLINIC | Age: 65
End: 2025-01-23
Payer: MEDICARE

## 2025-01-23 VITALS
RESPIRATION RATE: 20 BRPM | HEIGHT: 70 IN | SYSTOLIC BLOOD PRESSURE: 140 MMHG | HEART RATE: 84 BPM | OXYGEN SATURATION: 99 % | DIASTOLIC BLOOD PRESSURE: 78 MMHG | BODY MASS INDEX: 45.1 KG/M2 | TEMPERATURE: 96.8 F | WEIGHT: 315 LBS

## 2025-01-23 DIAGNOSIS — Z12.5 SCREENING FOR PROSTATE CANCER: ICD-10-CM

## 2025-01-23 DIAGNOSIS — E11.65 TYPE 2 DIABETES MELLITUS WITH HYPERGLYCEMIA, WITH LONG-TERM CURRENT USE OF INSULIN: ICD-10-CM

## 2025-01-23 DIAGNOSIS — E66.01 CLASS 3 SEVERE OBESITY DUE TO EXCESS CALORIES WITH SERIOUS COMORBIDITY AND BODY MASS INDEX (BMI) OF 45.0 TO 49.9 IN ADULT: Chronic | ICD-10-CM

## 2025-01-23 DIAGNOSIS — M25.50 POLYARTHRALGIA: ICD-10-CM

## 2025-01-23 DIAGNOSIS — E66.813 CLASS 3 SEVERE OBESITY DUE TO EXCESS CALORIES WITH SERIOUS COMORBIDITY AND BODY MASS INDEX (BMI) OF 45.0 TO 49.9 IN ADULT: Chronic | ICD-10-CM

## 2025-01-23 DIAGNOSIS — Z79.4 TYPE 2 DIABETES MELLITUS WITH HYPERGLYCEMIA, WITH LONG-TERM CURRENT USE OF INSULIN: ICD-10-CM

## 2025-01-23 DIAGNOSIS — Z00.00 ROUTINE GENERAL MEDICAL EXAMINATION AT HEALTH CARE FACILITY: Primary | ICD-10-CM

## 2025-01-23 DIAGNOSIS — E78.5 HYPERLIPIDEMIA, UNSPECIFIED HYPERLIPIDEMIA TYPE: ICD-10-CM

## 2025-01-23 PROCEDURE — 3078F DIAST BP <80 MM HG: CPT | Performed by: FAMILY MEDICINE

## 2025-01-23 PROCEDURE — 4004F PT TOBACCO SCREEN RCVD TLK: CPT | Performed by: FAMILY MEDICINE

## 2025-01-23 PROCEDURE — 4010F ACE/ARB THERAPY RXD/TAKEN: CPT | Performed by: FAMILY MEDICINE

## 2025-01-23 PROCEDURE — 99213 OFFICE O/P EST LOW 20 MIN: CPT | Performed by: FAMILY MEDICINE

## 2025-01-23 PROCEDURE — 3008F BODY MASS INDEX DOCD: CPT | Performed by: FAMILY MEDICINE

## 2025-01-23 PROCEDURE — G2211 COMPLEX E/M VISIT ADD ON: HCPCS | Performed by: FAMILY MEDICINE

## 2025-01-23 PROCEDURE — 3077F SYST BP >= 140 MM HG: CPT | Performed by: FAMILY MEDICINE

## 2025-01-23 PROCEDURE — G0439 PPPS, SUBSEQ VISIT: HCPCS | Performed by: FAMILY MEDICINE

## 2025-01-23 RX ORDER — HYDROXYCHLOROQUINE SULFATE 200 MG/1
200 TABLET, FILM COATED ORAL DAILY
Qty: 90 TABLET | Refills: 1 | Status: SHIPPED | OUTPATIENT
Start: 2025-01-23 | End: 2025-07-22

## 2025-01-23 RX ORDER — INSULIN LISPRO 200 [IU]/ML
INJECTION, SOLUTION SUBCUTANEOUS
Qty: 34 EACH | Refills: 3 | Status: SHIPPED | OUTPATIENT
Start: 2025-01-23

## 2025-01-23 SDOH — ECONOMIC STABILITY: FOOD INSECURITY: WITHIN THE PAST 12 MONTHS, YOU WORRIED THAT YOUR FOOD WOULD RUN OUT BEFORE YOU GOT MONEY TO BUY MORE.: NEVER TRUE

## 2025-01-23 SDOH — ECONOMIC STABILITY: FOOD INSECURITY: WITHIN THE PAST 12 MONTHS, THE FOOD YOU BOUGHT JUST DIDN'T LAST AND YOU DIDN'T HAVE MONEY TO GET MORE.: NEVER TRUE

## 2025-01-23 ASSESSMENT — ENCOUNTER SYMPTOMS
DEPRESSION: 0
LOSS OF SENSATION IN FEET: 0
OCCASIONAL FEELINGS OF UNSTEADINESS: 0

## 2025-01-23 ASSESSMENT — ACTIVITIES OF DAILY LIVING (ADL)
TAKING_MEDICATION: INDEPENDENT
DRESSING: INDEPENDENT
MANAGING_FINANCES: INDEPENDENT
BATHING: INDEPENDENT
DOING_HOUSEWORK: INDEPENDENT
GROCERY_SHOPPING: INDEPENDENT

## 2025-01-23 ASSESSMENT — PATIENT HEALTH QUESTIONNAIRE - PHQ9
SUM OF ALL RESPONSES TO PHQ9 QUESTIONS 1 & 2: 0
1. LITTLE INTEREST OR PLEASURE IN DOING THINGS: NOT AT ALL
2. FEELING DOWN, DEPRESSED OR HOPELESS: NOT AT ALL

## 2025-01-23 ASSESSMENT — LIFESTYLE VARIABLES
HOW MANY STANDARD DRINKS CONTAINING ALCOHOL DO YOU HAVE ON A TYPICAL DAY: PATIENT DOES NOT DRINK
AUDIT-C TOTAL SCORE: 0
HOW OFTEN DO YOU HAVE A DRINK CONTAINING ALCOHOL: NEVER
HOW OFTEN DO YOU HAVE SIX OR MORE DRINKS ON ONE OCCASION: NEVER
SKIP TO QUESTIONS 9-10: 1

## 2025-01-23 ASSESSMENT — PAIN SCALES - GENERAL: PAINLEVEL_OUTOF10: 0-NO PAIN

## 2025-01-23 NOTE — PROGRESS NOTES
"Subjective   Reason for Visit: Rahul Hernandez is an 64 y.o. male here for a Medicare Wellness visit.     Past Medical, Surgical, and Family History reviewed and updated in chart.    Reviewed all medications by prescribing practitioner or clinical pharmacist (such as prescriptions, OTCs, herbal therapies and supplements) and documented in the medical record.    Diabetes  He presents for his follow-up diabetic visit. He has type 2 diabetes mellitus. Risk factors for coronary artery disease include male sex, hypertension, obesity, diabetes mellitus and dyslipidemia. Current diabetic treatment includes insulin injections. He is compliant with treatment all of the time. Exercise: plans on going local gym, his pain is better when he is exercersing and his blood sugars tend to be better as well. Home blood sugar record trend: In the past 90 days he has been in range 59% of the time, high 30% of the time, and very high 8% of the time. An ACE inhibitor/angiotensin II receptor blocker is being taken. Eye exam is current.       Patient Care Team:  Tena Bradley DO as PCP - General  Tena Bradley DO as PCP - [employer]vantage PCP  Jerad Leahy MD as Consulting Physician (Cardiology)  TERRENCE Naranjo as Nurse Practitioner (Pain Medicine)  Jarod Peterson DC as Consulting Physician (Chiropractic Medicine)  Maritza Armas PA-C as Physician Assistant (Gastroenterology)         Objective   Vitals:  /78 (BP Location: Right arm, Patient Position: Sitting, BP Cuff Size: Adult long)   Pulse 84   Temp 36 °C (96.8 °F) (Temporal)   Resp 20   Ht 1.778 m (5' 10\")   Wt 146 kg (322 lb)   SpO2 99%   BMI 46.20 kg/m²       Physical Exam  Constitutional: Well nourished, well developed, appears stated age  Eyes: no scleral icterus, no conjunctival injection  Cardiovascular: regular rate and rhythm,   Respiratory: normal respiratory effort, clear to auscultation bilaterally  Skin: Warm, dry. No " wilbur  Neurologic: Alert, CNs II-XII grossly intact..  Psych: Appropriate mood and affect.    Assessment & Plan  Routine general medical examination at health care facility  Colonoscopy done 8/2021       Type 2 diabetes mellitus with hyperglycemia, with long-term current use of insulin  Controlled per October A1C of 6.8%, recheck  Continue Humalog TID with meals and at bedtime. Insulin rx adjusted to reflect current use.  Continue Dexcom G7, patient is benefiting from use      Orders:    insulin lispro (HumaLOG KwikPen Insulin) 200 unit/mL (3 mL) insulin pen pen; Take 65 units with WITH your breakfast, lunch, and dinner. Take 20 units with your evening snack.    Comprehensive Metabolic Panel; Future    Hemoglobin A1C; Future    Polyarthralgia  Has failed multiple NSAIDs through pain management.  Will have him try Plaquenil for 3 months.  Discussed how if he develops worsening vision to discontinue the medication immediately.  He does have an appointment to see his eye doctor in the next few weeks.  Orders:    hydroxychloroquine (PlaqueniL) 200 mg tablet; Take 1 tablet (200 mg) by mouth once daily.    Hyperlipidemia, unspecified hyperlipidemia type    Orders:    Lipid Panel; Future    Class 3 severe obesity due to excess calories with serious comorbidity and body mass index (BMI) of 45.0 to 49.9 in adult  Down 12 lbs since last visit   GLP1 for DM was too expensive         Screening for prostate cancer    Orders:    Prostate Specific Antigen, Screen; Future       Follow up 3-4 months.   Tena Bradley DO

## 2025-01-23 NOTE — ASSESSMENT & PLAN NOTE
Controlled per October A1C of 6.8%, recheck  Continue Humalog TID with meals and at bedtime. Insulin rx adjusted to reflect current use.  Continue Dexcom G7, patient is benefiting from use      Orders:    insulin lispro (HumaLOG KwikPen Insulin) 200 unit/mL (3 mL) insulin pen pen; Take 65 units with WITH your breakfast, lunch, and dinner. Take 20 units with your evening snack.    Comprehensive Metabolic Panel; Future    Hemoglobin A1C; Future

## 2025-03-31 ENCOUNTER — TELEPHONE (OUTPATIENT)
Dept: PRIMARY CARE | Facility: CLINIC | Age: 65
End: 2025-03-31
Payer: MEDICARE

## 2025-03-31 DIAGNOSIS — J06.9 ACUTE URI: Primary | ICD-10-CM

## 2025-03-31 RX ORDER — AMOXICILLIN AND CLAVULANATE POTASSIUM 875; 125 MG/1; MG/1
875 TABLET, FILM COATED ORAL 2 TIMES DAILY
Qty: 14 TABLET | Refills: 0 | Status: SHIPPED | OUTPATIENT
Start: 2025-03-31 | End: 2025-04-07

## 2025-03-31 NOTE — TELEPHONE ENCOUNTER
Patient would like you to call in an antibiotic for him. He has earaches and is dizzy.    How long have you been sick? 3-4 days  Cough (productive or nonproductive)?yes  Color of phlegm?no  Sinus pain?no  Sinus drainage/color?no  Earache?yes  Congestion?yes  Headache?yes  Fever (if yes, temp)?no  Sore throat?no  Short of breath/wheezing?sob and wheezing  OTC medication?tylenol  LOV: 01/23/2025  NOV: 05/02/2025      Pharmacy:  [facility], [place]

## 2025-04-02 DIAGNOSIS — I25.10 ATHEROSCLEROSIS OF NATIVE CORONARY ARTERY OF NATIVE HEART WITHOUT ANGINA PECTORIS: ICD-10-CM

## 2025-04-02 DIAGNOSIS — E78.5 HYPERLIPIDEMIA, UNSPECIFIED HYPERLIPIDEMIA TYPE: ICD-10-CM

## 2025-04-02 RX ORDER — ATORVASTATIN CALCIUM 80 MG/1
80 TABLET, FILM COATED ORAL NIGHTLY
Qty: 90 TABLET | Refills: 3 | Status: SHIPPED | OUTPATIENT
Start: 2025-04-02

## 2025-04-30 ENCOUNTER — TELEPHONE (OUTPATIENT)
Dept: PRIMARY CARE | Facility: CLINIC | Age: 65
End: 2025-04-30
Payer: MEDICARE

## 2025-05-01 NOTE — ASSESSMENT & PLAN NOTE
Uncontrolled per Dexcom which shows time in range of only 44%  Referral to clinical pharmacy to help with management and see if patient qualifies for Mounjaro.  Ultimate goal would be to titrate up Mounjaro and decrease or possibly completely stop Humalog  Continue Dexcom G7, patient is benefiting from use    Orders:    Referral to Clinical Pharmacy; Future

## 2025-05-01 NOTE — PATIENT INSTRUCTIONS
Thank you for choosing [facility] for your healthcare.   As always if you have any questions or concerns please do not hesitate to call our office at [phone] or through [employer].    Have a great day!  Tena Bradley, DO

## 2025-05-02 ENCOUNTER — APPOINTMENT (OUTPATIENT)
Dept: PRIMARY CARE | Facility: CLINIC | Age: 65
End: 2025-05-02
Payer: MEDICARE

## 2025-05-02 VITALS
WEIGHT: 315 LBS | RESPIRATION RATE: 18 BRPM | SYSTOLIC BLOOD PRESSURE: 124 MMHG | HEART RATE: 66 BPM | DIASTOLIC BLOOD PRESSURE: 74 MMHG | TEMPERATURE: 97.5 F | OXYGEN SATURATION: 94 % | BODY MASS INDEX: 45.1 KG/M2 | HEIGHT: 70 IN

## 2025-05-02 DIAGNOSIS — E78.2 MIXED HYPERLIPIDEMIA: ICD-10-CM

## 2025-05-02 DIAGNOSIS — E53.8 VITAMIN B12 DEFICIENCY: ICD-10-CM

## 2025-05-02 DIAGNOSIS — Z79.4 TYPE 2 DIABETES MELLITUS WITH HYPERGLYCEMIA, WITH LONG-TERM CURRENT USE OF INSULIN: Primary | Chronic | ICD-10-CM

## 2025-05-02 DIAGNOSIS — E11.65 TYPE 2 DIABETES MELLITUS WITH HYPERGLYCEMIA, WITH LONG-TERM CURRENT USE OF INSULIN: Primary | Chronic | ICD-10-CM

## 2025-05-02 PROBLEM — E78.5 HYPERLIPIDEMIA: Chronic | Status: ACTIVE | Noted: 2023-02-08

## 2025-05-02 PROCEDURE — 3078F DIAST BP <80 MM HG: CPT | Performed by: FAMILY MEDICINE

## 2025-05-02 PROCEDURE — 3008F BODY MASS INDEX DOCD: CPT | Performed by: FAMILY MEDICINE

## 2025-05-02 PROCEDURE — 96372 THER/PROPH/DIAG INJ SC/IM: CPT | Performed by: FAMILY MEDICINE

## 2025-05-02 PROCEDURE — 4004F PT TOBACCO SCREEN RCVD TLK: CPT | Performed by: FAMILY MEDICINE

## 2025-05-02 PROCEDURE — 3074F SYST BP LT 130 MM HG: CPT | Performed by: FAMILY MEDICINE

## 2025-05-02 PROCEDURE — 4010F ACE/ARB THERAPY RXD/TAKEN: CPT | Performed by: FAMILY MEDICINE

## 2025-05-02 PROCEDURE — 99214 OFFICE O/P EST MOD 30 MIN: CPT | Performed by: FAMILY MEDICINE

## 2025-05-02 RX ORDER — CYANOCOBALAMIN 1000 UG/ML
1000 INJECTION, SOLUTION INTRAMUSCULAR; SUBCUTANEOUS ONCE
Status: COMPLETED | OUTPATIENT
Start: 2025-05-02 | End: 2025-05-02

## 2025-05-02 RX ORDER — ROSUVASTATIN CALCIUM 20 MG/1
20 TABLET, COATED ORAL DAILY
Qty: 90 TABLET | Refills: 3 | Status: SHIPPED | OUTPATIENT
Start: 2025-05-02 | End: 2026-05-02

## 2025-05-02 RX ADMIN — CYANOCOBALAMIN 1000 MCG: 1000 INJECTION, SOLUTION INTRAMUSCULAR; SUBCUTANEOUS at 09:27

## 2025-05-02 SDOH — ECONOMIC STABILITY: FOOD INSECURITY: WITHIN THE PAST 12 MONTHS, THE FOOD YOU BOUGHT JUST DIDN'T LAST AND YOU DIDN'T HAVE MONEY TO GET MORE.: NEVER TRUE

## 2025-05-02 SDOH — ECONOMIC STABILITY: FOOD INSECURITY: WITHIN THE PAST 12 MONTHS, YOU WORRIED THAT YOUR FOOD WOULD RUN OUT BEFORE YOU GOT MONEY TO BUY MORE.: NEVER TRUE

## 2025-05-02 ASSESSMENT — PAIN SCALES - GENERAL: PAINLEVEL_OUTOF10: 8

## 2025-05-02 ASSESSMENT — LIFESTYLE VARIABLES
SKIP TO QUESTIONS 9-10: 1
AUDIT-C TOTAL SCORE: 0
HOW MANY STANDARD DRINKS CONTAINING ALCOHOL DO YOU HAVE ON A TYPICAL DAY: PATIENT DOES NOT DRINK
HOW OFTEN DO YOU HAVE SIX OR MORE DRINKS ON ONE OCCASION: NEVER
HOW OFTEN DO YOU HAVE A DRINK CONTAINING ALCOHOL: NEVER

## 2025-05-02 NOTE — PROGRESS NOTES
"Subjective   Patient ID: Rahul Hernandez is a 64 y.o. male who presents for Follow-up (3 month follow-up, A1C check. States that he is just recovering from sinus infection. Has minor bilateral ear pain.), Diabetes (Type 2 diabetes management.), and Muscle Pain (Muscle pain all over body X multiple months. 8/10.).  He is recovering from a sinus infection.  Previously he had congestion in both ears which diminishes hearing but that is better now.  His sinus drainage is gradually improving also.    Diabetes  He uses Dexcom to monitor his blood glucose.  In the past 90 days time in range is 44%, high 38%, very high 16%.  His blood glucose was 140 this morning and is currently in the 200s though he has not had anything to eat today.  He is currently on insulin.  I tried prescribing him Mounjaro in the past but his co-pay was close to $200 which he could not afford.    He is concerned about myalgia in his arms and legs for months.  It limits his ability to exercise.        Patient Care Team:  Tena Bradley DO as PCP - General  Tena Bradley DO as PCP - [employer]vantage PCP  Jerad Leahy MD as Consulting Physician (Cardiology)  JULIANNE Naranjo-CNP as Nurse Practitioner (Pain Medicine)  Jarod Peterson DC as Consulting Physician (Chiropractic Medicine)  Maritza Armas PA-C as Physician Assistant (Gastroenterology)  Jacey Mccormick MD as Consulting Physician (Ophthalmology)    Current Medications[1]    Objective     Visit Vitals  /74 (BP Location: Left arm, Patient Position: Sitting, BP Cuff Size: Large adult)   Pulse 66   Temp 36.4 °C (97.5 °F)   Resp 18   Ht 1.778 m (5' 10\")   Wt (!) 151 kg (333 lb)   SpO2 94%   BMI 47.78 kg/m²   Smoking Status Some Days   BSA 2.73 m²        Constitutional: Well nourished, well developed, appears stated age  Eyes: no scleral icterus, no conjunctival injection  Respiratory: normal respiratory effort  Musculoskeletal: No gross deformities " appreciated  Skin: Warm, dry.  Neurologic: Alert, CNs II-XII grossly intact..  Psych: Appropriate mood and affect.        Assessment & Plan  Type 2 diabetes mellitus with hyperglycemia, with long-term current use of insulin  Uncontrolled per Dexcom which shows time in range of only 44%  Referral to clinical pharmacy to help with management and see if patient qualifies for Mounjaro.  Ultimate goal would be to titrate up Mounjaro and decrease or possibly completely stop Humalog  Continue Dexcom G7, patient is benefiting from use    Orders:    Referral to Clinical Pharmacy; Future      Mixed hyperlipidemia  Experiencing myalgias  Continue Zetia  Switch atorvastatin 80 mg to rosuvastatin 20 mg    Orders:    rosuvastatin (Crestor) 20 mg tablet; Take 1 tablet (20 mg) by mouth once daily.    Vitamin B12 deficiency    Orders:    cyanocobalamin (Vitamin B-12) injection 1,000 mcg     Follow up 3 months.    Tena Bradley DO         [1]   Current Outpatient Medications   Medication Sig Dispense Refill    aspirin 81 mg EC tablet Take 1 tablet (81 mg) by mouth once daily.      carvedilol (Coreg) 3.125 mg tablet TAKE ONE TABLET BY MOUTH TWO TIMES A  tablet 1    Dexcom G7 Sensor device Use to check blood glucose before administration of insulin 9 each 3    ezetimibe (Zetia) 10 mg tablet Take 1 tablet (10 mg) by mouth once daily. 90 tablet 3    hydroxychloroquine (PlaqueniL) 200 mg tablet Take 1 tablet (200 mg) by mouth once daily. 90 tablet 1    insulin lispro (HumaLOG KwikPen Insulin) 200 unit/mL (3 mL) insulin pen pen Take 65 units with WITH your breakfast, lunch, and dinner. Take 20 units with your evening snack. 34 each 3    lisinopril 20 mg tablet Take 1 tablet (20 mg) by mouth once daily. 90 tablet 3    OneTouch Ultra Test strip       pantoprazole (ProtoNix) 40 mg EC tablet Take 1 tablet (40 mg) by mouth 2 times a day. 180 tablet 3    rosuvastatin (Crestor) 20 mg tablet Take 1 tablet (20 mg) by mouth once daily.  90 tablet 3     No current facility-administered medications for this visit.

## 2025-05-02 NOTE — ASSESSMENT & PLAN NOTE
Experiencing myalgias  Continue Zetia  Switch atorvastatin 80 mg to rosuvastatin 20 mg    Orders:    rosuvastatin (Crestor) 20 mg tablet; Take 1 tablet (20 mg) by mouth once daily.

## 2025-05-06 LAB
ALBUMIN SERPL-MCNC: 4.5 G/DL (ref 3.6–5.1)
ALP SERPL-CCNC: 83 U/L (ref 35–144)
ALT SERPL-CCNC: 16 U/L (ref 9–46)
ANION GAP SERPL CALCULATED.4IONS-SCNC: 10 MMOL/L (CALC) (ref 7–17)
AST SERPL-CCNC: 15 U/L (ref 10–35)
BILIRUB SERPL-MCNC: 0.7 MG/DL (ref 0.2–1.2)
BUN SERPL-MCNC: 17 MG/DL (ref 7–25)
CALCIUM SERPL-MCNC: 9.2 MG/DL (ref 8.6–10.3)
CHLORIDE SERPL-SCNC: 100 MMOL/L (ref 98–110)
CHOLEST SERPL-MCNC: 138 MG/DL
CHOLEST/HDLC SERPL: 3 (CALC)
CO2 SERPL-SCNC: 27 MMOL/L (ref 20–32)
CREAT SERPL-MCNC: 0.92 MG/DL (ref 0.7–1.35)
EGFRCR SERPLBLD CKD-EPI 2021: 93 ML/MIN/1.73M2
EST. AVERAGE GLUCOSE BLD GHB EST-MCNC: 154 MG/DL
EST. AVERAGE GLUCOSE BLD GHB EST-SCNC: 8.5 MMOL/L
GLUCOSE SERPL-MCNC: 243 MG/DL (ref 65–99)
HBA1C MFR BLD: 7 %
HDLC SERPL-MCNC: 46 MG/DL
LDLC SERPL CALC-MCNC: 62 MG/DL (CALC)
NONHDLC SERPL-MCNC: 92 MG/DL (CALC)
POTASSIUM SERPL-SCNC: 4.5 MMOL/L (ref 3.5–5.3)
PROT SERPL-MCNC: 6.9 G/DL (ref 6.1–8.1)
PSA SERPL-MCNC: 0.51 NG/ML
SODIUM SERPL-SCNC: 137 MMOL/L (ref 135–146)
TRIGL SERPL-MCNC: 252 MG/DL

## 2025-05-09 NOTE — PROGRESS NOTES
"Pharmacist Clinic: Diabetes Management    Rahul Hernandez is a 64 y.o. male was referred to Clinical Pharmacy Team for diabetes management.     Referring Provider: Tena Bradley DO  - Last visit with referring provider: 5/2/25    Subjective     HPI    Current Diabetes Pharmacotherapy:    - Humalog 65 units with breakfast, lunch, and dinner; 20 units with evening snack    Social History:  Current diet: ***  Current exercise: ***    Eye exam for this year?: {yes***/no:42377::\"no\"}  Foot exam for this year?: {yes***/no:32601::\"no\"}    Current monitoring regimen:   Patient is using: continuous glucose monitor  Type of CGM: Dexcom G7      Reported blood sugars:     Fasting: ***    2-Hours Post Prandial: ***    Any episodes of hypoglycemia? {yes***/no:19481::\"no\"}    Adverse Effects: ***    Objective     There were no vitals taken for this visit.    Allergies[1]    Historical Diabetes Pharmacotherapy:  - Januvia   - Jardiance (headache)  - Metformin   - Ozempic    SECONDARY PREVENTION  - Statin? Yes   - ACE-I/ARB? Yes  - Aspirin? Yes    Pertinent PMH Review:  - PMH of Pancreatitis: {YES,NO:31637}  - PMH of Retinopathy: {YES,NO:84764}  - PMH of Urinary Tract Infections: {YES,NO:85472}  - PMH of Yeast Infections: {YES,NO:44242}  - PMH of MTC/MEN2: {YES,NO:06201}  - PMH of ASCVD: Yes   - Hx of ASHD and prior MI  - PMH of Sleep Apnea: Yes  - UACR/EGFR in last year?: Yes   -   Albumin/Creatinine Ratio   Date Value Ref Range Status   10/22/2024   Final     Comment:     One or more analytes used in this calculation is outside of the analytical measurement range. Calculation cannot be performed.       Lab Review  Lab Results   Component Value Date    BILITOT 0.7 05/05/2025    CALCIUM 9.2 05/05/2025    CO2 27 05/05/2025     05/05/2025    CREATININE 0.92 05/05/2025    GLUCOSE 243 (H) 05/05/2025    ALKPHOS 83 05/05/2025    K 4.5 05/05/2025    PROT 6.9 05/05/2025     05/05/2025    AST 15 05/05/2025    ALT 16 05/05/2025 "    BUN 17 05/05/2025    ANIONGAP 10 05/05/2025    ALBUMIN 4.5 05/05/2025    EGFR 93 05/05/2025     Lab Results   Component Value Date    TRIG 252 (H) 05/05/2025    CHOL 138 05/05/2025    HDL 46 05/05/2025     Lab Results   Component Value Date    HGBA1C 7.0 (H) 05/05/2025    HGBA1C 6.8 (H) 10/15/2024    HGBA1C 7.3 (H) 01/16/2024     The ASCVD Risk score (Taylor ORTEGA, et al., 2019) failed to calculate for the following reasons:    Risk score cannot be calculated because patient has a medical history suggesting prior/existing ASCVD    Drug Interactions:  None    Affordability/Accessibility:  GLP are costly    Preferred Pharmacy:  [facility]    Assessment/Plan   Problem List Items Addressed This Visit    None      ASSESSMENT:  Patients diabetes is controlled with most recent A1c of 7.0%.     Patient referred for DM management. Dr. Bradley would like patient to start on Mounjaro but it is costly.    PLAN:  START ***  CONTINUE ***  STOP ***  Education provided: ***  Follow up with clinical pharmacist: ***  Follow up with PCP: 8/11/25    Continue all meds under the continuation of care with the referring provider and clinical pharmacy team.    Thank you,  Jane Ballard, PharmD  Clinical Pharmacy Specialist  [phone]  [email or web address]     Verbal consent to manage patient's drug therapy was obtained from the patient. They were informed they may decline to participate or withdraw from participation in pharmacy services at any time.         [1] No Known Allergies       Counseled patient on Mounjaro MOA, expectations, side effects, duration of therapy, administration, and monitoring parameters.  Provided detailed dosing and administration counseling to ensure proper technique.   Reviewed Mounjaro titration schedule, starting with 2.5 mg once weekly to a goal of 15 mg once weekly if tolerated  Counseled patient on the benefits of GLP-1ra glycemic control and weight loss  Reviewed storage requirements of Mounjaro when not in use, and when to administer the medication if a dose is missed.  Advised patient that they may experience improved satiety after meals and portion sizes of meals may be reduced as doses of Mounjaro increase.    Follow up with clinical pharmacist: 6/16/25 @ 9:40   Follow up with PCP: 8/11/25    Continue all meds under the continuation of care with the referring provider and clinical pharmacy team.    Thank you,  Jane Ballard, PharmD  Clinical Pharmacy Specialist  [phone]  [email or web address]     Verbal consent to manage patient's drug therapy was obtained from the patient. They were informed they may decline to participate or withdraw from participation in pharmacy services at any time.         [1] No Known Allergies

## 2025-05-12 ENCOUNTER — APPOINTMENT (OUTPATIENT)
Dept: PHARMACY | Facility: HOSPITAL | Age: 65
End: 2025-05-12
Payer: MEDICARE

## 2025-05-12 DIAGNOSIS — Z79.4 TYPE 2 DIABETES MELLITUS WITH HYPERGLYCEMIA, WITH LONG-TERM CURRENT USE OF INSULIN: Chronic | ICD-10-CM

## 2025-05-12 DIAGNOSIS — E11.65 TYPE 2 DIABETES MELLITUS WITH HYPERGLYCEMIA, WITH LONG-TERM CURRENT USE OF INSULIN: Chronic | ICD-10-CM

## 2025-05-12 RX ORDER — TIRZEPATIDE 2.5 MG/.5ML
2.5 INJECTION, SOLUTION SUBCUTANEOUS
Qty: 2 ML | Refills: 1 | Status: SHIPPED | OUTPATIENT
Start: 2025-05-12

## 2025-05-19 ENCOUNTER — TELEPHONE (OUTPATIENT)
Dept: PRIMARY CARE | Facility: CLINIC | Age: 65
End: 2025-05-19
Payer: MEDICARE

## 2025-05-19 DIAGNOSIS — J06.9 ACUTE URI: Primary | ICD-10-CM

## 2025-05-19 RX ORDER — AMOXICILLIN AND CLAVULANATE POTASSIUM 875; 125 MG/1; MG/1
875 TABLET, FILM COATED ORAL 2 TIMES DAILY
Qty: 20 TABLET | Refills: 0 | Status: SHIPPED | OUTPATIENT
Start: 2025-05-19 | End: 2025-05-29

## 2025-05-19 NOTE — TELEPHONE ENCOUNTER
Patient called in stating that he had been sent an antibiotic a few weeks ago but now he is sick again requesting another one.    Patient has been sick for 1.5 weeks.   Sinus pressure, ears itch, body sore all over, bad headache for the past 3 days, and itchy throat.    Has tried OTC allergy and sinus medications.     Please advise.

## 2025-06-04 PROCEDURE — RXMED WILLOW AMBULATORY MEDICATION CHARGE

## 2025-06-06 ENCOUNTER — PHARMACY VISIT (OUTPATIENT)
Dept: PHARMACY | Facility: CLINIC | Age: 65
End: 2025-06-06
Payer: MEDICARE

## 2025-06-13 NOTE — PROGRESS NOTES
Pharmacist Clinic: Diabetes Management    Rahul Hernandez is a 64 y.o. male was referred to Clinical Pharmacy Team for diabetes management.     Referring Provider: Tena Bradley DO  - Last visit with referring provider: 5/2/25    Subjective     HPI    Current Diabetes Pharmacotherapy:    - Humalog 58-60 units with lunch and dinner   - Will adjust based on sugar #   - Mounjaro 2.5 mg weekly - Tuesday (1 dose so far)    Social History:  Current diet:   - Skip breakfast most days  - L: sandwich, soup  - D: 6p steak, mashed potatoes, beans  - Cutting down and trying to lose weight  - Snacks throughout the day  - Eats sweets throughout the day: PB cookies couple times a day  - Denies any soda  - Drinks a lot of water     Current exercise:   - Runs a farm    Current monitoring regimen:   Patient is using: continuous glucose monitor  Type of CGM: Dexcom G7 - reader    Reported blood sugars:     7 day report:  - High: 8%  - Target: 57%  - Low: 4%    14 day report:   - Very high: 8%  - High: 29%  - Target: 57%  - Low: 4%  - Very low: 2%    Any episodes of hypoglycemia? Yes  - Overnight (1-2 am)  - Happens 3 x a week but not every week  - Went to bed at 143 and in middle of night was 30-40s - felt sweaty     Adverse Effects: None    Objective     There were no vitals taken for this visit.    Allergies[1]    Historical Diabetes Pharmacotherapy:  - Januvia  - Jardiance (headache)  - Metformin   - Ozempic (costly)  - Mounjaro (costly)    SECONDARY PREVENTION  - Statin? Yes   - ACE-I/ARB? Yes  - Aspirin? Yes    Pertinent PMH Review:  - PMH of Pancreatitis: No  - PMH of Retinopathy: No  - PMH of Urinary Tract Infections: No  - PMH of Yeast Infections: No  - PMH of MTC/MEN2: No  - PMH of ASCVD: Yes   - Hx of ASHD and prior MI  - PMH of Sleep Apnea: Yes  - UACR/EGFR in last year?: Yes   -   Albumin/Creatinine Ratio   Date Value Ref Range Status   10/22/2024   Final     Comment:     One or more analytes used in this calculation is  outside of the analytical measurement range. Calculation cannot be performed.       Lab Review  Lab Results   Component Value Date    BILITOT 0.7 05/05/2025    CALCIUM 9.2 05/05/2025    CO2 27 05/05/2025     05/05/2025    CREATININE 0.92 05/05/2025    GLUCOSE 243 (H) 05/05/2025    ALKPHOS 83 05/05/2025    K 4.5 05/05/2025    PROT 6.9 05/05/2025     05/05/2025    AST 15 05/05/2025    ALT 16 05/05/2025    BUN 17 05/05/2025    ANIONGAP 10 05/05/2025    ALBUMIN 4.5 05/05/2025    EGFR 93 05/05/2025     Lab Results   Component Value Date    TRIG 252 (H) 05/05/2025    CHOL 138 05/05/2025    HDL 46 05/05/2025     Lab Results   Component Value Date    HGBA1C 7.0 (H) 05/05/2025    HGBA1C 6.8 (H) 10/15/2024    HGBA1C 7.3 (H) 01/16/2024     The ASCVD Risk score (Taylor ORTEGA, et al., 2019) failed to calculate for the following reasons:    Risk score cannot be calculated because patient has a medical history suggesting prior/existing ASCVD    Drug Interactions:  - None    Affordability/Accessibility:  - Enrolled in [facility]     Preferred Pharmacy:  - [facility]    Assessment/Plan   Problem List Items Addressed This Visit       Type 2 diabetes mellitus with hyperglycemia, with long-term current use of insulin (Chronic)    Relevant Medications    glucose 4 gram chewable tablet    Other Relevant Orders    Referral to Clinical Pharmacy     ASSESSMENT:  Patients diabetes is controlled with most recent A1c of 7.0%.     Patient just did one dose of Mounjaro 2.5 mg so far. Reports no side effects. He is still doing about 58-60 units of Humalog with lunch and dinner and goes low overnight. Had some  go as low as 30-40's. Today we will cut Humalog in half to 35 units with lunch and dinner with goal of getting off of insulin completely. Told him to decrease dinner dose by 5 units if he notices he is still going low or give me a call. Will follow in 2 weeks. I will also order him some glucose tablets.     PLAN:  DECREASE Humalog to  35 units with lunch and dinner  CONTINUE Mounjaro 2.5 mg weekly  Follow up with clinical pharmacist: 6/30/25 @ 10   Follow up with PCP: 8/11/25    Continue all meds under the continuation of care with the referring provider and clinical pharmacy team.    Thank you,  Jane Ballard, PharmD  Clinical Pharmacy Specialist  [phone]  [email or web address]     Verbal consent to manage patient's drug therapy was obtained from the patient. They were informed they may decline to participate or withdraw from participation in pharmacy services at any time.         [1] No Known Allergies

## 2025-06-16 ENCOUNTER — TELEPHONE (OUTPATIENT)
Dept: PHARMACY | Facility: HOSPITAL | Age: 65
End: 2025-06-16

## 2025-06-16 ENCOUNTER — APPOINTMENT (OUTPATIENT)
Dept: PHARMACY | Facility: HOSPITAL | Age: 65
End: 2025-06-16
Payer: MEDICARE

## 2025-06-16 DIAGNOSIS — Z79.4 TYPE 2 DIABETES MELLITUS WITH HYPERGLYCEMIA, WITH LONG-TERM CURRENT USE OF INSULIN: Primary | Chronic | ICD-10-CM

## 2025-06-16 DIAGNOSIS — E11.65 TYPE 2 DIABETES MELLITUS WITH HYPERGLYCEMIA, WITH LONG-TERM CURRENT USE OF INSULIN: Primary | Chronic | ICD-10-CM

## 2025-06-16 DIAGNOSIS — E11.65 TYPE 2 DIABETES MELLITUS WITH HYPERGLYCEMIA, WITH LONG-TERM CURRENT USE OF INSULIN: Chronic | ICD-10-CM

## 2025-06-16 DIAGNOSIS — Z79.4 TYPE 2 DIABETES MELLITUS WITH HYPERGLYCEMIA, WITH LONG-TERM CURRENT USE OF INSULIN: Chronic | ICD-10-CM

## 2025-06-16 RX ORDER — IBUPROFEN 200 MG
TABLET ORAL
Qty: 50 TABLET | Refills: 5 | Status: SHIPPED | OUTPATIENT
Start: 2025-06-16

## 2025-06-27 PROCEDURE — RXMED WILLOW AMBULATORY MEDICATION CHARGE

## 2025-06-27 NOTE — PROGRESS NOTES
Pharmacist Clinic: Diabetes Management    Rahul Hernandez is a 64 y.o. male was referred to Clinical Pharmacy Team for diabetes management.     Referring Provider: Tena Bradley DO  - Last visit with referring provider: 5/2/25     Patient Assistance for Mounjaro approved through 6/4/26. Will have to be renewed prior to that date to prevent lapse in coverage. Medication(s) will be received at no cost to patient from [facility].     Subjective     HPI    Current Diabetes Pharmacotherapy:    - Humalog 35 units with lunch and dinner  - Mounjaro 2.5 mg weekly - Tuesday    Social History:  Current diet:   - Skip breakfast most days  - L: sandwich, soup  - D: 6p steak, mashed potatoes, beans  - Cutting down and trying to lose weight  - Snacks throughout the day  - Eats sweets throughout the day: PB cookies couple times a day  - Denies any soda  - Drinks a lot of water     Current exercise:   - Runs a farm    Current monitoring regimen:   Patient is using: continuous glucose monitor  Type of CGM: Dexcom G7 - reader    Reported blood sugars:   - 148 throughout day    7 day report:  - Very high: 3%  - High: 25%  - Target: 65%  - Low: 6%  - Very low: 1%    14 day report:   - Very high: 1%  - High: 27%  - Target: 68%  - Low: 3%  - Very low: 1%    Any episodes of hypoglycemia? Yes  - May feel low when working out in yard  - Mostly getting alerted at night    Adverse Effects: None    Objective     There were no vitals taken for this visit.    Allergies[1]    Historical Diabetes Pharmacotherapy:  - Januvia  - Jardiance (headache)  - Metformin   - Ozempic (costly)  - Mounjaro (costly)    SECONDARY PREVENTION  - Statin? Yes   - ACE-I/ARB? Yes  - Aspirin? Yes    Pertinent PMH Review:  - PMH of Pancreatitis: No  - PMH of Retinopathy: No  - PMH of Urinary Tract Infections: No  - PMH of Yeast Infections: No  - PMH of MTC/MEN2: No  - PMH of ASCVD: Yes   - Hx of ASHD and prior MI  - PMH of Sleep Apnea: Yes  - UACR/EGFR in  last year?: Yes   -   Albumin/Creatinine Ratio   Date Value Ref Range Status   10/22/2024   Final     Comment:     One or more analytes used in this calculation is outside of the analytical measurement range. Calculation cannot be performed.       Lab Review  Lab Results   Component Value Date    BILITOT 0.7 05/05/2025    CALCIUM 9.2 05/05/2025    CO2 27 05/05/2025     05/05/2025    CREATININE 0.92 05/05/2025    GLUCOSE 243 (H) 05/05/2025    ALKPHOS 83 05/05/2025    K 4.5 05/05/2025    PROT 6.9 05/05/2025     05/05/2025    AST 15 05/05/2025    ALT 16 05/05/2025    BUN 17 05/05/2025    ANIONGAP 10 05/05/2025    ALBUMIN 4.5 05/05/2025    EGFR 93 05/05/2025     Lab Results   Component Value Date    TRIG 252 (H) 05/05/2025    CHOL 138 05/05/2025    HDL 46 05/05/2025     Lab Results   Component Value Date    HGBA1C 7.0 (H) 05/05/2025    HGBA1C 6.8 (H) 10/15/2024    HGBA1C 7.3 (H) 01/16/2024     The ASCVD Risk score (Taylor ORTEGA, et al., 2019) failed to calculate for the following reasons:    Risk score cannot be calculated because patient has a medical history suggesting prior/existing ASCVD    Drug Interactions:  - None    Affordability/Accessibility:  - Enrolled in [facility]     Preferred Pharmacy:  - [facility]    Assessment/Plan   Problem List Items Addressed This Visit       Type 2 diabetes mellitus with hyperglycemia, with long-term current use of insulin (Chronic)    Relevant Medications    tirzepatide (Mounjaro) 5 mg/0.5 mL pen injector    Other Relevant Orders    Referral to Clinical Pharmacy       ASSESSMENT:  Patients diabetes is controlled with most recent A1c of 7.0%.     Patient is really liking being on Mounjaro. Denies any side effects. Still seems to be having lows especially at night. Will decrease Humalog further to 25 units with goal of discontinuing. I will also increase Mounjaro to 5 mg.     PLAN:  DECREASE Humalog to 25 units with lunch and dinner  INCREASE Mounjaro to 5 mg weekly   Follow  up with clinical pharmacist: 8/8/25 @ 9  Follow up with PCP: 8/11/25    Continue all meds under the continuation of care with the referring provider and clinical pharmacy team.    Thank you,  Jane Ballard, PharmD  Clinical Pharmacy Specialist  [phone]  [email or web address]     Verbal consent to manage patient's drug therapy was obtained from the patient. They were informed they may decline to participate or withdraw from participation in pharmacy services at any time.         [1] No Known Allergies

## 2025-06-28 ENCOUNTER — PHARMACY VISIT (OUTPATIENT)
Dept: PHARMACY | Facility: CLINIC | Age: 65
End: 2025-06-28
Payer: MEDICARE

## 2025-06-30 ENCOUNTER — APPOINTMENT (OUTPATIENT)
Dept: PHARMACY | Facility: HOSPITAL | Age: 65
End: 2025-06-30
Payer: MEDICARE

## 2025-06-30 DIAGNOSIS — Z79.4 TYPE 2 DIABETES MELLITUS WITH HYPERGLYCEMIA, WITH LONG-TERM CURRENT USE OF INSULIN: Chronic | ICD-10-CM

## 2025-06-30 DIAGNOSIS — E11.65 TYPE 2 DIABETES MELLITUS WITH HYPERGLYCEMIA, WITH LONG-TERM CURRENT USE OF INSULIN: Chronic | ICD-10-CM

## 2025-06-30 PROCEDURE — RXMED WILLOW AMBULATORY MEDICATION CHARGE

## 2025-06-30 RX ORDER — TIRZEPATIDE 5 MG/.5ML
5 INJECTION, SOLUTION SUBCUTANEOUS
Qty: 2 ML | Refills: 1 | Status: SHIPPED | OUTPATIENT
Start: 2025-06-30

## 2025-07-01 ENCOUNTER — PHARMACY VISIT (OUTPATIENT)
Dept: PHARMACY | Facility: CLINIC | Age: 65
End: 2025-07-01
Payer: MEDICARE

## 2025-07-01 DIAGNOSIS — Z79.4 TYPE 2 DIABETES MELLITUS WITH HYPERGLYCEMIA, WITH LONG-TERM CURRENT USE OF INSULIN: Chronic | ICD-10-CM

## 2025-07-01 DIAGNOSIS — E11.65 TYPE 2 DIABETES MELLITUS WITH HYPERGLYCEMIA, WITH LONG-TERM CURRENT USE OF INSULIN: Chronic | ICD-10-CM

## 2025-07-01 RX ORDER — BLOOD-GLUCOSE SENSOR
EACH MISCELLANEOUS
Qty: 9 EACH | Refills: 1 | Status: SHIPPED | OUTPATIENT
Start: 2025-07-01

## 2025-07-23 DIAGNOSIS — I10 BENIGN ESSENTIAL HYPERTENSION: ICD-10-CM

## 2025-07-23 RX ORDER — LISINOPRIL 20 MG/1
20 TABLET ORAL DAILY
Qty: 90 TABLET | Refills: 1 | Status: SHIPPED | OUTPATIENT
Start: 2025-07-23

## 2025-07-29 PROCEDURE — RXMED WILLOW AMBULATORY MEDICATION CHARGE

## 2025-07-31 ENCOUNTER — PHARMACY VISIT (OUTPATIENT)
Dept: PHARMACY | Facility: CLINIC | Age: 65
End: 2025-07-31
Payer: MEDICARE

## 2025-08-04 ASSESSMENT — ENCOUNTER SYMPTOMS: BACK PAIN: 1

## 2025-08-07 NOTE — PROGRESS NOTES
Pharmacist Clinic: Diabetes Management    Rahul Hernandez is a 65 y.o. male was referred to Clinical Pharmacy Team for diabetes management.     Referring Provider: Tena Bradley, DO  - Last visit with referring provider: 5/2/25     Patient Assistance for Mounjaro approved through 6/4/26. Will have to be renewed prior to that date to prevent lapse in coverage. Medication(s) will be received at no cost to patient from [facility].     Subjective     HPI    Current Diabetes Pharmacotherapy:    - Humalog 25 units with lunch and dinner  - Mounjaro 5 mg weekly - Tuesday    Social History:  Current diet:   - Skip breakfast most days  - L: sandwich, soup  - D: 6p steak, mashed potatoes, beans  - Cutting down and trying to lose weight  - Snacks throughout the day  - Eats sweets throughout the day: PB cookies couple times a day  - Denies any soda  - Drinks a lot of water   - Decreased portion sizes with Mounjaro    Current exercise:   - Runs a farm    Current monitoring regimen:   Patient is using: continuous glucose monitor  Type of CGM: Dexcom G7 - reader    Reported blood sugars:     14 day report:   - Very high: 1%  - High: 20%  - Target: 73%  - Low: 5%  - Very low: 1%   - Low at nighttime    Any episodes of hypoglycemia? Yes  - May feel low when working out in yard  - Mostly getting alerted at night  - Wakes up to sweating    Adverse Effects: None    Objective     There were no vitals taken for this visit.    Allergies[1]    Historical Diabetes Pharmacotherapy:  - Januvia  - Jardiance (headache)  - Metformin   - Ozempic (costly)  - Mounjaro (costly)    SECONDARY PREVENTION  - Statin? Yes   - ACE-I/ARB? Yes  - Aspirin? Yes    Pertinent PMH Review:  - PMH of Pancreatitis: No  - PMH of Retinopathy: No  - PMH of Urinary Tract Infections: No  - PMH of Yeast Infections: No  - PMH of MTC/MEN2: No  - PMH of ASCVD: Yes   - Hx of ASHD and prior MI  - PMH of Sleep Apnea: Yes  - UACR/EGFR in last year?: Yes   -    Albumin/Creatinine Ratio   Date Value Ref Range Status   10/22/2024   Final     Comment:     One or more analytes used in this calculation is outside of the analytical measurement range. Calculation cannot be performed.       Lab Review  Lab Results   Component Value Date    BILITOT 0.7 05/05/2025    CALCIUM 9.2 05/05/2025    CO2 27 05/05/2025     05/05/2025    CREATININE 0.92 05/05/2025    GLUCOSE 243 (H) 05/05/2025    ALKPHOS 83 05/05/2025    K 4.5 05/05/2025    PROT 6.9 05/05/2025     05/05/2025    AST 15 05/05/2025    ALT 16 05/05/2025    BUN 17 05/05/2025    ANIONGAP 10 05/05/2025    ALBUMIN 4.5 05/05/2025    EGFR 93 05/05/2025     Lab Results   Component Value Date    TRIG 252 (H) 05/05/2025    CHOL 138 05/05/2025    HDL 46 05/05/2025     Lab Results   Component Value Date    HGBA1C 7.0 (H) 05/05/2025    HGBA1C 6.8 (H) 10/15/2024    HGBA1C 7.3 (H) 01/16/2024     The ASCVD Risk score (Taylor ORTGEA, et al., 2019) failed to calculate for the following reasons:    Risk score cannot be calculated because patient has a medical history suggesting prior/existing ASCVD    Drug Interactions:  - None    Affordability/Accessibility:  - Enrolled in [facility]     Preferred Pharmacy:  - [facility]    Assessment/Plan   Problem List Items Addressed This Visit       Type 2 diabetes mellitus with hyperglycemia, with long-term current use of insulin - Primary (Chronic)    Relevant Orders    Referral to Clinical Pharmacy    Hemoglobin A1c    Albumin-Creatinine Ratio, Urine Random     ASSESSMENT:  Patients diabetes is controlled with most recent A1c of 7.0%.     A1c due. He is very happy with how his blood sugars look. Daniel report stating 73% in range withy 5% lows. He states he goes low at night and experience sweating. Our goal is to hopefully get him completely off of Humalog. Today we will decrease Humalog from 25 units to 20 units with meals. Once we get lows under control, we will then increase  Mounjaro.    PLAN:  DECREASE Humalog to 20 units with lunch and dinner  CONTINUE Mounjaro 5 mg weekly  Follow up with clinical pharmacist: 9/5/25 @ 9:40   Follow up with PCP: 8/11/25    Continue all meds under the continuation of care with the referring provider and clinical pharmacy team.    Thank you,  Jane Ballard, PharmD  Clinical Pharmacy Specialist  [phone]  [email or web address]     Verbal consent to manage patient's drug therapy was obtained from the patient. They were informed they may decline to participate or withdraw from participation in pharmacy services at any time.         [1] No Known Allergies

## 2025-08-08 ENCOUNTER — APPOINTMENT (OUTPATIENT)
Dept: PHARMACY | Facility: HOSPITAL | Age: 65
End: 2025-08-08
Payer: MEDICARE

## 2025-08-08 DIAGNOSIS — Z79.4 TYPE 2 DIABETES MELLITUS WITH HYPERGLYCEMIA, WITH LONG-TERM CURRENT USE OF INSULIN: Primary | ICD-10-CM

## 2025-08-08 DIAGNOSIS — E11.65 TYPE 2 DIABETES MELLITUS WITH HYPERGLYCEMIA, WITH LONG-TERM CURRENT USE OF INSULIN: Primary | ICD-10-CM

## 2025-08-11 ENCOUNTER — APPOINTMENT (OUTPATIENT)
Dept: PRIMARY CARE | Facility: CLINIC | Age: 65
End: 2025-08-11
Payer: MEDICARE

## 2025-08-11 VITALS
DIASTOLIC BLOOD PRESSURE: 64 MMHG | OXYGEN SATURATION: 98 % | WEIGHT: 312 LBS | TEMPERATURE: 97 F | HEIGHT: 70 IN | BODY MASS INDEX: 44.67 KG/M2 | RESPIRATION RATE: 20 BRPM | HEART RATE: 64 BPM | SYSTOLIC BLOOD PRESSURE: 140 MMHG

## 2025-08-11 DIAGNOSIS — E11.65 TYPE 2 DIABETES MELLITUS WITH HYPERGLYCEMIA, WITH LONG-TERM CURRENT USE OF INSULIN: Primary | Chronic | ICD-10-CM

## 2025-08-11 DIAGNOSIS — J30.89 ENVIRONMENTAL AND SEASONAL ALLERGIES: ICD-10-CM

## 2025-08-11 DIAGNOSIS — E66.813 CLASS 3 SEVERE OBESITY WITH SERIOUS COMORBIDITY AND BODY MASS INDEX (BMI) OF 40.0 TO 44.9 IN ADULT: Chronic | ICD-10-CM

## 2025-08-11 DIAGNOSIS — Z79.4 TYPE 2 DIABETES MELLITUS WITH HYPERGLYCEMIA, WITH LONG-TERM CURRENT USE OF INSULIN: Primary | Chronic | ICD-10-CM

## 2025-08-11 PROCEDURE — 3077F SYST BP >= 140 MM HG: CPT | Performed by: FAMILY MEDICINE

## 2025-08-11 PROCEDURE — 3008F BODY MASS INDEX DOCD: CPT | Performed by: FAMILY MEDICINE

## 2025-08-11 PROCEDURE — 99214 OFFICE O/P EST MOD 30 MIN: CPT | Performed by: FAMILY MEDICINE

## 2025-08-11 PROCEDURE — G2211 COMPLEX E/M VISIT ADD ON: HCPCS | Performed by: FAMILY MEDICINE

## 2025-08-11 PROCEDURE — 4010F ACE/ARB THERAPY RXD/TAKEN: CPT | Performed by: FAMILY MEDICINE

## 2025-08-11 PROCEDURE — 3078F DIAST BP <80 MM HG: CPT | Performed by: FAMILY MEDICINE

## 2025-08-11 RX ORDER — LEVOCETIRIZINE DIHYDROCHLORIDE 5 MG/1
5 TABLET, FILM COATED ORAL EVERY EVENING
Qty: 90 TABLET | Refills: 3 | Status: SHIPPED | OUTPATIENT
Start: 2025-08-11 | End: 2026-08-11

## 2025-08-11 RX ORDER — ATORVASTATIN CALCIUM 80 MG/1
80 TABLET, FILM COATED ORAL DAILY
COMMUNITY

## 2025-08-11 SDOH — ECONOMIC STABILITY: FOOD INSECURITY: WITHIN THE PAST 12 MONTHS, THE FOOD YOU BOUGHT JUST DIDN'T LAST AND YOU DIDN'T HAVE MONEY TO GET MORE.: NEVER TRUE

## 2025-08-11 SDOH — ECONOMIC STABILITY: FOOD INSECURITY: WITHIN THE PAST 12 MONTHS, YOU WORRIED THAT YOUR FOOD WOULD RUN OUT BEFORE YOU GOT MONEY TO BUY MORE.: NEVER TRUE

## 2025-08-11 ASSESSMENT — ANXIETY QUESTIONNAIRES
3. WORRYING TOO MUCH ABOUT DIFFERENT THINGS: NOT AT ALL
2. NOT BEING ABLE TO STOP OR CONTROL WORRYING: NOT AT ALL
1. FEELING NERVOUS, ANXIOUS, OR ON EDGE: NOT AT ALL
4. TROUBLE RELAXING: NOT AT ALL
5. BEING SO RESTLESS THAT IT IS HARD TO SIT STILL: NOT AT ALL
7. FEELING AFRAID AS IF SOMETHING AWFUL MIGHT HAPPEN: NOT AT ALL
IF YOU CHECKED OFF ANY PROBLEMS ON THIS QUESTIONNAIRE, HOW DIFFICULT HAVE THESE PROBLEMS MADE IT FOR YOU TO DO YOUR WORK, TAKE CARE OF THINGS AT HOME, OR GET ALONG WITH OTHER PEOPLE: NOT DIFFICULT AT ALL
GAD7 TOTAL SCORE: 0
6. BECOMING EASILY ANNOYED OR IRRITABLE: NOT AT ALL

## 2025-08-11 ASSESSMENT — LIFESTYLE VARIABLES
HOW OFTEN DO YOU HAVE A DRINK CONTAINING ALCOHOL: NEVER
AUDIT-C TOTAL SCORE: 0
HOW MANY STANDARD DRINKS CONTAINING ALCOHOL DO YOU HAVE ON A TYPICAL DAY: PATIENT DOES NOT DRINK
SKIP TO QUESTIONS 9-10: 1
HOW OFTEN DO YOU HAVE SIX OR MORE DRINKS ON ONE OCCASION: NEVER

## 2025-08-11 ASSESSMENT — ENCOUNTER SYMPTOMS
OCCASIONAL FEELINGS OF UNSTEADINESS: 0
BACK PAIN: 1
DEPRESSION: 0
LOSS OF SENSATION IN FEET: 0

## 2025-08-11 ASSESSMENT — PATIENT HEALTH QUESTIONNAIRE - PHQ9
1. LITTLE INTEREST OR PLEASURE IN DOING THINGS: NOT AT ALL
2. FEELING DOWN, DEPRESSED OR HOPELESS: NOT AT ALL
SUM OF ALL RESPONSES TO PHQ9 QUESTIONS 1 & 2: 0

## 2025-08-11 ASSESSMENT — PAIN SCALES - GENERAL: PAINLEVEL_OUTOF10: 8

## 2025-08-12 LAB
ALBUMIN/CREAT UR: NORMAL MG/G CREAT
CREAT UR-MCNC: 36 MG/DL (ref 20–320)
EST. AVERAGE GLUCOSE BLD GHB EST-MCNC: 123 MG/DL
EST. AVERAGE GLUCOSE BLD GHB EST-SCNC: 6.8 MMOL/L
HBA1C MFR BLD: 5.9 %
MICROALBUMIN UR-MCNC: <0.2 MG/DL

## 2025-08-22 DIAGNOSIS — E11.65 TYPE 2 DIABETES MELLITUS WITH HYPERGLYCEMIA, WITH LONG-TERM CURRENT USE OF INSULIN: Chronic | ICD-10-CM

## 2025-08-22 DIAGNOSIS — Z79.4 TYPE 2 DIABETES MELLITUS WITH HYPERGLYCEMIA, WITH LONG-TERM CURRENT USE OF INSULIN: Chronic | ICD-10-CM

## 2025-08-22 PROCEDURE — RXMED WILLOW AMBULATORY MEDICATION CHARGE

## 2025-08-22 RX ORDER — TIRZEPATIDE 5 MG/.5ML
5 INJECTION, SOLUTION SUBCUTANEOUS
Qty: 2 ML | Refills: 1 | Status: SHIPPED | OUTPATIENT
Start: 2025-08-22

## 2025-08-26 ENCOUNTER — PHARMACY VISIT (OUTPATIENT)
Dept: PHARMACY | Facility: CLINIC | Age: 65
End: 2025-08-26
Payer: MEDICARE

## 2025-08-27 DIAGNOSIS — I10 BENIGN ESSENTIAL HYPERTENSION: ICD-10-CM

## 2025-08-27 DIAGNOSIS — E78.2 MIXED HYPERLIPIDEMIA: Primary | ICD-10-CM

## 2025-08-27 RX ORDER — ATORVASTATIN CALCIUM 80 MG/1
80 TABLET, FILM COATED ORAL DAILY
Qty: 90 TABLET | Refills: 3 | Status: SHIPPED | OUTPATIENT
Start: 2025-08-27 | End: 2026-08-27

## 2025-08-27 RX ORDER — LISINOPRIL 20 MG/1
20 TABLET ORAL DAILY
Qty: 90 TABLET | Refills: 3 | Status: SHIPPED | OUTPATIENT
Start: 2025-08-27 | End: 2026-08-27

## 2025-09-05 ENCOUNTER — APPOINTMENT (OUTPATIENT)
Dept: PHARMACY | Facility: HOSPITAL | Age: 65
End: 2025-09-05
Payer: MEDICARE

## 2025-10-03 ENCOUNTER — APPOINTMENT (OUTPATIENT)
Dept: PHARMACY | Facility: HOSPITAL | Age: 65
End: 2025-10-03
Payer: MEDICARE

## 2025-11-17 ENCOUNTER — APPOINTMENT (OUTPATIENT)
Dept: PRIMARY CARE | Facility: CLINIC | Age: 65
End: 2025-11-17
Payer: MEDICARE

## 2026-02-17 ENCOUNTER — APPOINTMENT (OUTPATIENT)
Dept: PRIMARY CARE | Facility: CLINIC | Age: 66
End: 2026-02-17
Payer: MEDICARE